# Patient Record
Sex: FEMALE | Race: WHITE | Employment: OTHER | URBAN - NONMETROPOLITAN AREA
[De-identification: names, ages, dates, MRNs, and addresses within clinical notes are randomized per-mention and may not be internally consistent; named-entity substitution may affect disease eponyms.]

---

## 2021-08-05 ENCOUNTER — APPOINTMENT (OUTPATIENT)
Dept: GENERAL RADIOLOGY | Age: 86
End: 2021-08-05
Attending: EMERGENCY MEDICINE
Payer: MEDICARE

## 2021-08-05 ENCOUNTER — HOSPITAL ENCOUNTER (INPATIENT)
Age: 86
LOS: 2 days | Discharge: NURSING, REHAB OR LONG-TERM CARE | DRG: 536 | End: 2021-08-09
Attending: EMERGENCY MEDICINE | Admitting: HOSPITALIST
Payer: MEDICARE

## 2021-08-05 ENCOUNTER — HOSPITAL ENCOUNTER (EMERGENCY)
Age: 86
Discharge: ANOTHER HOSPITAL | End: 2021-08-05
Attending: EMERGENCY MEDICINE
Payer: MEDICARE

## 2021-08-05 ENCOUNTER — APPOINTMENT (OUTPATIENT)
Dept: CT IMAGING | Age: 86
DRG: 536 | End: 2021-08-05
Attending: EMERGENCY MEDICINE
Payer: MEDICARE

## 2021-08-05 ENCOUNTER — APPOINTMENT (OUTPATIENT)
Dept: CT IMAGING | Age: 86
End: 2021-08-05
Attending: EMERGENCY MEDICINE
Payer: MEDICARE

## 2021-08-05 VITALS
WEIGHT: 115 LBS | DIASTOLIC BLOOD PRESSURE: 82 MMHG | OXYGEN SATURATION: 97 % | BODY MASS INDEX: 22.58 KG/M2 | SYSTOLIC BLOOD PRESSURE: 173 MMHG | RESPIRATION RATE: 20 BRPM | HEART RATE: 77 BPM | HEIGHT: 60 IN | TEMPERATURE: 98.2 F

## 2021-08-05 DIAGNOSIS — S42.215A CLOSED NONDISPLACED FRACTURE OF SURGICAL NECK OF LEFT HUMERUS, UNSPECIFIED FRACTURE MORPHOLOGY, INITIAL ENCOUNTER: ICD-10-CM

## 2021-08-05 DIAGNOSIS — S32.811A MULTIPLE CLOSED FRACTURES OF PELVIS WITH UNSTABLE DISRUPTION OF PELVIC RING, INITIAL ENCOUNTER (HCC): Primary | ICD-10-CM

## 2021-08-05 DIAGNOSIS — S32.9XXA CLOSED NONDISPLACED FRACTURE OF PELVIS, UNSPECIFIED PART OF PELVIS, INITIAL ENCOUNTER (HCC): Primary | ICD-10-CM
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
## 2021-08-05 LAB
ABO + RH BLD: NORMAL
ALBUMIN SERPL-MCNC: 3.5 G/DL (ref 3.5–5)
ALBUMIN/GLOB SERPL: 1.1 {RATIO} (ref 1.1–2.2)
ALP SERPL-CCNC: 134 U/L (ref 45–117)
ALT SERPL-CCNC: 21 U/L (ref 12–78)
ANION GAP SERPL CALC-SCNC: 5 MMOL/L (ref 5–15)
APTT PPP: 32 SEC (ref 21.2–34.1)
AST SERPL W P-5'-P-CCNC: 23 U/L (ref 15–37)
BASOPHILS # BLD: 0 K/UL (ref 0–0.1)
BASOPHILS NFR BLD: 0 % (ref 0–1)
BILIRUB SERPL-MCNC: 0.8 MG/DL (ref 0.2–1)
BLOOD GROUP ANTIBODIES SERPL: NEGATIVE
BUN SERPL-MCNC: 14 MG/DL (ref 6–20)
BUN/CREAT SERPL: 20 (ref 12–20)
CA-I BLD-MCNC: 8.6 MG/DL (ref 8.5–10.1)
CHLORIDE SERPL-SCNC: 105 MMOL/L (ref 97–108)
CO2 SERPL-SCNC: 30 MMOL/L (ref 21–32)
CREAT SERPL-MCNC: 0.71 MG/DL (ref 0.55–1.02)
DIFFERENTIAL METHOD BLD: ABNORMAL
EOSINOPHIL # BLD: 0.2 K/UL (ref 0–0.4)
EOSINOPHIL NFR BLD: 2 % (ref 0–7)
ERYTHROCYTE [DISTWIDTH] IN BLOOD BY AUTOMATED COUNT: 15.3 % (ref 11.5–14.5)
GLOBULIN SER CALC-MCNC: 3.3 G/DL (ref 2–4)
GLUCOSE SERPL-MCNC: 107 MG/DL (ref 65–100)
HCT VFR BLD AUTO: 40 % (ref 35–47)
HGB BLD-MCNC: 13.6 G/DL (ref 11.5–16)
IMM GRANULOCYTES # BLD AUTO: 0.1 K/UL (ref 0–0.04)
IMM GRANULOCYTES NFR BLD AUTO: 1 % (ref 0–0.5)
INR PPP: 1.1 (ref 0.9–1.1)
LYMPHOCYTES # BLD: 0.9 K/UL (ref 0.8–3.5)
LYMPHOCYTES NFR BLD: 11 % (ref 12–49)
MCH RBC QN AUTO: 34.9 PG (ref 26–34)
MCHC RBC AUTO-ENTMCNC: 34 G/DL (ref 30–36.5)
MCV RBC AUTO: 102.6 FL (ref 80–99)
MONOCYTES # BLD: 0.8 K/UL (ref 0–1)
MONOCYTES NFR BLD: 9 % (ref 5–13)
NEUTS SEG # BLD: 6.9 K/UL (ref 1.8–8)
NEUTS SEG NFR BLD: 77 % (ref 32–75)
NRBC # BLD: 0 K/UL (ref 0–0.01)
NRBC BLD-RTO: 0 PER 100 WBC
PLATELET # BLD AUTO: 151 K/UL (ref 150–400)
PMV BLD AUTO: 11.7 FL (ref 8.9–12.9)
POTASSIUM SERPL-SCNC: 3.2 MMOL/L (ref 3.5–5.1)
PROT SERPL-MCNC: 6.8 G/DL (ref 6.4–8.2)
PROTHROMBIN TIME: 13.6 SEC (ref 11.9–14.7)
RBC # BLD AUTO: 3.9 M/UL (ref 3.8–5.2)
SODIUM SERPL-SCNC: 140 MMOL/L (ref 136–145)
SPECIMEN EXP DATE BLD: NORMAL
THERAPEUTIC RANGE,PTTT: NORMAL SEC (ref 82–109)
WBC # BLD AUTO: 8.9 K/UL (ref 3.6–11)

## 2021-08-05 PROCEDURE — 87086 URINE CULTURE/COLONY COUNT: CPT

## 2021-08-05 PROCEDURE — 85025 COMPLETE CBC W/AUTO DIFF WBC: CPT

## 2021-08-05 PROCEDURE — 70450 CT HEAD/BRAIN W/O DYE: CPT

## 2021-08-05 PROCEDURE — 81001 URINALYSIS AUTO W/SCOPE: CPT

## 2021-08-05 PROCEDURE — 80053 COMPREHEN METABOLIC PANEL: CPT

## 2021-08-05 PROCEDURE — 85730 THROMBOPLASTIN TIME PARTIAL: CPT

## 2021-08-05 PROCEDURE — 99218 HC RM OBSERVATION: CPT

## 2021-08-05 PROCEDURE — 74011250637 HC RX REV CODE- 250/637: Performed by: PHYSICIAN ASSISTANT

## 2021-08-05 PROCEDURE — 36415 COLL VENOUS BLD VENIPUNCTURE: CPT

## 2021-08-05 PROCEDURE — 86901 BLOOD TYPING SEROLOGIC RH(D): CPT

## 2021-08-05 PROCEDURE — 93005 ELECTROCARDIOGRAM TRACING: CPT

## 2021-08-05 PROCEDURE — 99284 EMERGENCY DEPT VISIT MOD MDM: CPT

## 2021-08-05 PROCEDURE — 74176 CT ABD & PELVIS W/O CONTRAST: CPT

## 2021-08-05 PROCEDURE — 85610 PROTHROMBIN TIME: CPT

## 2021-08-05 PROCEDURE — 73030 X-RAY EXAM OF SHOULDER: CPT

## 2021-08-05 RX ORDER — SODIUM CHLORIDE 0.9 % (FLUSH) 0.9 %
5-40 SYRINGE (ML) INJECTION EVERY 8 HOURS
Status: DISCONTINUED | OUTPATIENT
Start: 2021-08-05 | End: 2021-08-09 | Stop reason: HOSPADM

## 2021-08-05 RX ORDER — OXYBUTYNIN CHLORIDE 5 MG/1
5 TABLET ORAL 3 TIMES DAILY
Status: DISCONTINUED | OUTPATIENT
Start: 2021-08-05 | End: 2021-08-06

## 2021-08-05 RX ORDER — LEVOTHYROXINE SODIUM 75 UG/1
75 TABLET ORAL
COMMUNITY

## 2021-08-05 RX ORDER — ACETAMINOPHEN 650 MG/1
650 SUPPOSITORY RECTAL
Status: DISCONTINUED | OUTPATIENT
Start: 2021-08-05 | End: 2021-08-09 | Stop reason: HOSPADM

## 2021-08-05 RX ORDER — TRIAMCINOLONE ACETONIDE 1 MG/G
OINTMENT TOPICAL 2 TIMES DAILY
COMMUNITY

## 2021-08-05 RX ORDER — POLYETHYLENE GLYCOL 3350 17 G/17G
17 POWDER, FOR SOLUTION ORAL DAILY PRN
Status: DISCONTINUED | OUTPATIENT
Start: 2021-08-05 | End: 2021-08-09 | Stop reason: HOSPADM

## 2021-08-05 RX ORDER — AMLODIPINE BESYLATE 5 MG/1
5 TABLET ORAL DAILY
Status: DISCONTINUED | OUTPATIENT
Start: 2021-08-06 | End: 2021-08-09 | Stop reason: HOSPADM

## 2021-08-05 RX ORDER — ACETAMINOPHEN 325 MG/1
650 TABLET ORAL
Status: DISCONTINUED | OUTPATIENT
Start: 2021-08-05 | End: 2021-08-09 | Stop reason: HOSPADM

## 2021-08-05 RX ORDER — TRAMADOL HYDROCHLORIDE 50 MG/1
50 TABLET ORAL
Status: DISCONTINUED | OUTPATIENT
Start: 2021-08-05 | End: 2021-08-09 | Stop reason: HOSPADM

## 2021-08-05 RX ORDER — LEVOTHYROXINE SODIUM 75 UG/1
75 TABLET ORAL
Status: DISCONTINUED | OUTPATIENT
Start: 2021-08-06 | End: 2021-08-09 | Stop reason: HOSPADM

## 2021-08-05 RX ORDER — ONDANSETRON 2 MG/ML
4 INJECTION INTRAMUSCULAR; INTRAVENOUS
Status: DISCONTINUED | OUTPATIENT
Start: 2021-08-05 | End: 2021-08-09 | Stop reason: HOSPADM

## 2021-08-05 RX ORDER — AMLODIPINE BESYLATE 5 MG/1
5 TABLET ORAL DAILY
COMMUNITY

## 2021-08-05 RX ORDER — OXYBUTYNIN CHLORIDE 5 MG/1
5 TABLET ORAL 3 TIMES DAILY
COMMUNITY

## 2021-08-05 RX ORDER — GUAIFENESIN 100 MG/5ML
200 SOLUTION ORAL
COMMUNITY
End: 2021-08-09

## 2021-08-05 RX ORDER — SODIUM CHLORIDE 0.9 % (FLUSH) 0.9 %
5-40 SYRINGE (ML) INJECTION AS NEEDED
Status: DISCONTINUED | OUTPATIENT
Start: 2021-08-05 | End: 2021-08-09 | Stop reason: HOSPADM

## 2021-08-05 RX ORDER — POLYETHYLENE GLYCOL 400 AND PROPYLENE GLYCOL 4; 3 MG/ML; MG/ML
2 SOLUTION/ DROPS OPHTHALMIC AS NEEDED
COMMUNITY

## 2021-08-05 RX ORDER — ONDANSETRON 4 MG/1
4 TABLET, ORALLY DISINTEGRATING ORAL
Status: DISCONTINUED | OUTPATIENT
Start: 2021-08-05 | End: 2021-08-09 | Stop reason: HOSPADM

## 2021-08-05 RX ORDER — SENNOSIDES 8.6 MG/1
1 TABLET ORAL DAILY
COMMUNITY

## 2021-08-05 RX ADMIN — Medication 10 ML: at 22:53

## 2021-08-05 RX ADMIN — OXYBUTYNIN CHLORIDE 5 MG: 5 TABLET ORAL at 22:49

## 2021-08-05 NOTE — ED PROVIDER NOTES
EMERGENCY DEPARTMENT HISTORY AND PHYSICAL EXAM      Date: 8/5/2021  Patient Name: Valeriy Hernandez    History of Presenting Illness     Chief Complaint   Patient presents with   Phoebe Conine Fall    Hip Pain       History Provided By: Patient and Nursing Home/SNF/Rehab Center    HPI: Valeriy Hernandez, 80 y.o. female with a past medical history significant Dementia presents to the ED with cc of pain to left hip when attempting to stand and possible pain in left shoulder; patient fell yesterday from standing position plain x-rays done showed no signs of fracture or dislocation of the hip but today patient is complaining of pain in the left hip and pain when moving left upper extremity    There are no other complaints, changes, or physical findings at this time. PCP: Marcia Waldron MD    No current facility-administered medications on file prior to encounter. No current outpatient medications on file prior to encounter. Past History     Past Medical History:  No past medical history on file. Past Surgical History:  No past surgical history on file. Family History:  No family history on file. Social History:  Social History     Tobacco Use    Smoking status: Not on file   Substance Use Topics    Alcohol use: Not on file    Drug use: Not on file       Allergies:  No Known Allergies      Review of Systems     Review of Systems   Constitutional: Negative for chills and fever. HENT: Negative for rhinorrhea and sore throat. Eyes: Negative for pain and visual disturbance. Respiratory: Negative for cough and shortness of breath. Cardiovascular: Negative for chest pain and leg swelling. Gastrointestinal: Negative for abdominal pain and vomiting. Endocrine: Negative for polydipsia and polyuria. Genitourinary: Negative for dysuria and urgency. Musculoskeletal: Positive for arthralgias and myalgias. Skin: Negative for color change and pallor.    Neurological: Negative for weakness and numbness. Psychiatric/Behavioral: Negative. Physical Exam     Physical Exam  Vitals and nursing note reviewed. Constitutional:       General: She is not in acute distress. Appearance: She is not ill-appearing or toxic-appearing. HENT:      Head: Normocephalic and atraumatic. Mouth/Throat:      Mouth: Mucous membranes are moist.      Pharynx: Oropharynx is clear. Eyes:      Extraocular Movements: Extraocular movements intact. Conjunctiva/sclera: Conjunctivae normal.      Pupils: Pupils are equal, round, and reactive to light. Cardiovascular:      Rate and Rhythm: Normal rate and regular rhythm. Pulses: Normal pulses. Heart sounds: Normal heart sounds. Pulmonary:      Effort: Pulmonary effort is normal.      Breath sounds: Normal breath sounds. Abdominal:      General: Bowel sounds are normal.      Palpations: Abdomen is soft. Musculoskeletal:         General: Tenderness, deformity and signs of injury present. Right shoulder: Normal.      Left shoulder: Deformity and crepitus present. No tenderness. Cervical back: Normal range of motion and neck supple. Right hip: Normal.      Left hip: Tenderness present. Decreased range of motion. Skin:     General: Skin is warm and dry. Capillary Refill: Capillary refill takes less than 2 seconds. Neurological:      General: No focal deficit present. Mental Status: She is alert. Mental status is at baseline. Psychiatric:         Mood and Affect: Mood normal.         Behavior: Behavior normal.         Lab and Diagnostic Study Results     Labs -   No results found for this or any previous visit (from the past 12 hour(s)).     Radiologic Studies -   @lastxrresult@  CT Results  (Last 48 hours)               08/05/21 1247  CT ABD PELV WO CONT Final result    Impression:  Multiple pelvic fractures, mainly on the left, not involving the   hips       Narrative:  CT dose reduction was achieved through use of a standardized protocol tailored   for this examination and automatic exposure control for dose modulation. Noncontrast study shows normal liver, spleen, pancreas. Gallstone and high   density bile. Adrenals and kidneys are normal       Advanced arterial calcification. No small bowel abnormality, lymphadenopathy or   free fluid       Uterus is small. No mass or free fluid. Bladder is empty. Rectum mildly   distended with gas. Advanced distal diverticulosis without wall thickening or   fat stranding       Slightly offset oblique fractures of left inferior pubic ramus and ischium. Mild   adjacent hematoma. Slightly distracted fracture of the left superior pubic   ramus, close to the anterior acetabular column. No fracture of the hip. Slightly   offset oblique fracture of the right superior pubic ramus immediately adjacent   to the symphysis       Advanced degenerative changes in the spine and central endplate depressions,   likely all chronic               CXR Results  (Last 48 hours)    None            Medical Decision Making   - I am the first provider for this patient. - I reviewed the vital signs, available nursing notes, past medical history, past surgical history, family history and social history. - Initial assessment performed. The patients presenting problems have been discussed, and they are in agreement with the care plan formulated and outlined with them. I have encouraged them to ask questions as they arise throughout their visit. Vital Signs-Reviewed the patient's vital signs.   Patient Vitals for the past 12 hrs:   Temp Pulse Resp BP SpO2   08/05/21 1359     97 %   08/05/21 1358  77 20 (!) 173/82 97 %   08/05/21 1218 98.2 °F (36.8 °C) 85 18 (!) 188/95 97 %       Records Reviewed: Nursing Notes    The patient presents with joint pain with a differential diagnosis of closed fracture, dislocations, ligamentous strain      ED Course:     ED Course as of Aug 05 1410   Thu Aug 05, 2021 [phone] Requested consult called to Dr. Lc Gomez    [SB]   2807 Dr. Lc Gomez asked that patient be transferred to [facility]    [SB]      ED Course User Index  [SB] Sander Sage MD       Provider Notes (Medical Decision Making): MDM       Procedures   Medical Decision Makingedical Decision Making  Performed by: Precious Becerril MD  PROCEDURES:  Procedures       Disposition   Disposition: Condition stable and ongoing  Transferred to [facility] patient verbally agreed to transfer and understand the risks involved as outlined in the EMTALA form. Transferred to [address] [place]:  1. There are no discharge medications for this patient. 2.   Follow-up Information    None       3. Return to ED if worse   4. There are no discharge medications for this patient. Diagnosis     Clinical Impression:   1. Multiple closed fractures of pelvis with unstable disruption of pelvic ring, initial encounter ([facility] [address]    2. Closed nondisplaced fracture of surgical neck of left humerus, unspecified fracture morphology, initial encounter        Attestations:    Precious Becerril MD    Please note that this dictation was completed with [employer], the computer voice recognition software. Quite often unanticipated grammatical, syntax, homophones, and other interpretive errors are inadvertently transcribed by the computer software. Please disregard these errors. Please excuse any errors that have escaped final proofreading. Thank you.

## 2021-08-05 NOTE — ED TRIAGE NOTES
Pt is a transfer from [facility] in [facility]; pt sustained a fall yesterday and per a CT of the ABD/Pelvis pt has multiple pelvic fxs noted along with a left humeral neck fx; pt here for orthopeadic evaluation and admission; pt is very [facility]

## 2021-08-05 NOTE — ED NOTES
Pt report given to [place], [facility] waiting on truck at this time

## 2021-08-05 NOTE — H&P
History and Physical    Patient: Conrado Jerez MRN: [id number]  SSN: [id number]    YOB: 1917  Age: 80 y.o. Sex: female      Subjective:      Conrado Jerez is a 80 y.o. female who presents to the emergency department after a ground-level fall 2020 4 hours ago. Initial x-rays did not reveal any obvious fractures. Patient has a chronic left shoulder fracture from a fall 3 years ago. . CT scan of the abdomen pelvis was performed for further evaluation revealing multiple pelvic fractures mainly on the left and not involving the hips. There was a adjacent hematoma present. Orthopedics has been contacted and would like the patient admitted for observation. CT of the head with no acute intracranial abnormality. Patient is from [facility]. Have discussed the case with the patient's power of , son Jerson Daley at home phone number [phone]    Past Medical History:   Diagnosis Date    Hypertension      Past Surgical History:   Procedure Laterality Date    HX OTHER SURGICAL      none      Family History   Problem Relation Age of Onset    Hypertension Mother      Social History     Tobacco Use    Smoking status: Never Smoker    Smokeless tobacco: Never Used   Substance Use Topics    Alcohol use: Not Currently      Prior to Admission medications    Medication Sig Start Date End Date Taking? Authorizing Provider   guaiFENesin (ROBITUSSIN) 100 mg/5 mL liquid Take 200 mg by mouth three (3) times daily as needed for Cough. Yes Provider, Historical   peg 400-propylene glycol (Systane, propylene glycoL,) 0.4-0.3 % drop 2 Drops as needed. Yes Provider, Historical   oxybutynin (DITROPAN) 5 mg tablet Take 5 mg by mouth three (3) times daily. Yes Provider, Historical   senna (Senna) 8.6 mg tablet Take 1 Tablet by mouth daily. Yes Provider, Historical   levothyroxine (SYNTHROID) 75 mcg tablet Take 75 mcg by mouth Daily (before breakfast).    Yes Provider, Historical amLODIPine (Norvasc) 5 mg tablet Take 5 mg by mouth daily. Yes Provider, Historical        No Known Allergies    Review of Systems:  Review of Systems   Unable to perform ROS: Patient nonverbal        Objective:     Recent Results (from the past 24 hour(s))   CBC WITH AUTOMATED DIFF    Collection Time: 08/05/21  4:30 PM   Result Value Ref Range    WBC 8.9 3.6 - 11.0 K/uL    RBC 3.90 3.80 - 5.20 M/uL    HGB 13.6 11.5 - 16.0 g/dL    HCT 40.0 35.0 - 47.0 %    .6 (H) 80.0 - 99.0 FL    MCH 34.9 (H) 26.0 - 34.0 PG    MCHC 34.0 30.0 - 36.5 g/dL    RDW 15.3 (H) 11.5 - 14.5 %    PLATELET 196 572 - 195 K/uL    MPV 11.7 8.9 - 12.9 FL    NRBC 0.0 0.0  WBC    ABSOLUTE NRBC 0.00 0.00 - 0.01 K/uL    NEUTROPHILS 77 (H) 32 - 75 %    LYMPHOCYTES 11 (L) 12 - 49 %    MONOCYTES 9 5 - 13 %    EOSINOPHILS 2 0 - 7 %    BASOPHILS 0 0 - 1 %    IMMATURE GRANULOCYTES 1 (H) 0 - 0.5 %    ABS. NEUTROPHILS 6.9 1.8 - 8.0 K/UL    ABS. LYMPHOCYTES 0.9 0.8 - 3.5 K/UL    ABS. MONOCYTES 0.8 0.0 - 1.0 K/UL    ABS. EOSINOPHILS 0.2 0.0 - 0.4 K/UL    ABS. BASOPHILS 0.0 0.0 - 0.1 K/UL    ABS. IMM. GRANS. 0.1 (H) 0.00 - 0.04 K/UL    DF AUTOMATED     METABOLIC PANEL, COMPREHENSIVE    Collection Time: 08/05/21  4:30 PM   Result Value Ref Range    Sodium 140 136 - 145 mmol/L    Potassium 3.2 (L) 3.5 - 5.1 mmol/L    Chloride 105 97 - 108 mmol/L    CO2 30 21 - 32 mmol/L    Anion gap 5 5 - 15 mmol/L    Glucose 107 (H) 65 - 100 mg/dL    BUN 14 6 - 20 mg/dL    Creatinine 0.71 0.55 - 1.02 mg/dL    BUN/Creatinine ratio 20 12 - 20      GFR est AA >60 >60 ml/min/1.73m2    GFR est non-AA >60 >60 ml/min/1.73m2    Calcium 8.6 8.5 - 10.1 mg/dL    Bilirubin, total 0.8 0.2 - 1.0 mg/dL    AST (SGOT) 23 15 - 37 U/L    ALT (SGPT) 21 12 - 78 U/L    Alk.  phosphatase 134 (H) 45 - 117 U/L    Protein, total 6.8 6.4 - 8.2 g/dL    Albumin 3.5 3.5 - 5.0 g/dL    Globulin 3.3 2.0 - 4.0 g/dL    A-G Ratio 1.1 1.1 - 2.2     PROTHROMBIN TIME + INR    Collection Time: 08/05/21 4:30 PM   Result Value Ref Range    Prothrombin time 13.6 11.9 - 14.7 sec    INR 1.1 0.9 - 1.1     PTT    Collection Time: 08/05/21  4:30 PM   Result Value Ref Range    aPTT 32.0 21.2 - 34.1 sec    aPTT, therapeutic range   82 - 109 sec        CT HEAD WO CONT   Final Result   No acute intracranial abnormality. Chronic changes as described. Vitals:    08/05/21 1547   BP: (!) 152/84   Pulse: 73   Resp: 16   Temp: 98.3 °F (36.8 °C)   SpO2: 98%   Weight: 52.2 kg (115 lb)   Height: 5' (1.524 m)        Physical Exam:  Physical Exam  Vitals reviewed. HENT:      Head: Normocephalic and atraumatic. Mouth/Throat:      Mouth: Mucous membranes are moist.      Pharynx: Oropharynx is clear. Cardiovascular:      Rate and Rhythm: Normal rate and regular rhythm. Heart sounds: Normal heart sounds. Pulmonary:      Effort: Pulmonary effort is normal.      Breath sounds: Normal breath sounds. Abdominal:      General: Abdomen is flat. Bowel sounds are normal.      Palpations: Abdomen is soft. Musculoskeletal:      Cervical back: Normal range of motion and neck supple. Comments: Pain in the right posterior and left posterior pelvis as well as anterior left pelvis. Patient has sensation present as she grimaces with deep palpation of the lower extremities. Skin:     General: Skin is warm and dry. Neurological:      General: No focal deficit present. Mental Status: She is alert. She is disoriented. Psychiatric:         Mood and Affect: Mood normal.          Assessment:     Hospital Problems  Never Reviewed        Codes Class Noted POA    Pelvic fracture ([facility] ICD-10-CM: S32. 9XXA  ICD-9-CM: 808.8  8/5/2021 Unknown        Fall ICD-10-CM: W19. Barbara Nikolay  ICD-9-CM: W402.9  8/5/2021 Unknown              Plan:     Impression:  1. Ground-level fall  2. Pelvic fracture    Plan:    1. Ground-level fall  UA for further evaluation of septic causes for the fall    2.   Pelvic fracture  Orthopedic consultation  CT with multiple fractures of the pelvis without involving the hip with adjacent hematoma  Continue to monitor hemoglobin hematocrit, labs currently pending    3.   Essential hypertension  Continue Norvasc    CODE STATUS: DNR    DVT prophylaxis: SCDs  Ulcer prophylaxis: Not indicated    Discussed case with patient's power of  Delroy Rosas and he confirmed DNR status, home number [phone]    Time of evaluation 50 minutes    Signed By: Maida Malagon PA-C     August 5, 2021

## 2021-08-05 NOTE — ED TRIAGE NOTES
EMS reports that patient had a ground level fall yesterday, was wearing hip protectors, portable xray was done yesterday but this morning patient was still reporting increase pain to the left hip. Nursing home staff states that patient was also grimacing in pain when her left arm was moved, but EMS states they were able to move patient's left arm with no pain from patient.

## 2021-08-05 NOTE — ED PROVIDER NOTES
EMERGENCY DEPARTMENT HISTORY AND PHYSICAL EXAM        Date: 8/5/2021  Patient Name: Heike Gallegos    History of Presenting Illness     Chief Complaint   Patient presents with    Fracture       History Provided By: Transferring emergency medicine physician    HPI: Heike Gallegos, 80 y.o. female with history of atrial fibrillation, Alzheimer's disease and hypertension who presents with ground-level fall that happened yesterday. Had an x-ray done yesterday that showed no fractures. CT scan was done due to pain in the hip at outside facility today showing fractures. Patient transferred here for further care and orthopedic consultation. Patient is a poor historian and does not remember the fall. She has poor hearing as well. History is limited due to this. PCP: Chaz Cardenas MD        Past History     Past Medical History:  No past medical history on file. Past Surgical History:  No past surgical history on file. Family History:  No family history on file. Social History:  Social History     Tobacco Use    Smoking status: Not on file   Substance Use Topics    Alcohol use: Not on file    Drug use: Not on file       Allergies:  No Known Allergies      Review of Systems   Review of Systems   Unable to perform ROS: Dementia     Physical Exam   Constitutional: No acute distress. Thin. Skin: No rash. ENT: No rhinorrhea. No cough. Head is normocephalic and atraumatic. Eye: No proptosis or conjunctival injections. Respiratory: No apparent respiratory distress. Gastrointestinal: Nondistended. Musculoskeletal: No obvious bony deformities. Tenderness to the pelvis. Psychiatric: Cooperative. Appropriate mood and affect. Diagnostic Study Results     Labs -   No results found for this or any previous visit (from the past 24 hour(s)).     Radiologic Studies -   No orders to display     CT Results  (Last 48 hours)               08/05/21 1247  CT ABD PELV WO CONT Final result    Impression: Multiple pelvic fractures, mainly on the left, not involving the   hips       Narrative:  CT dose reduction was achieved through use of a standardized protocol tailored   for this examination and automatic exposure control for dose modulation. Noncontrast study shows normal liver, spleen, pancreas. Gallstone and high   density bile. Adrenals and kidneys are normal       Advanced arterial calcification. No small bowel abnormality, lymphadenopathy or   free fluid       Uterus is small. No mass or free fluid. Bladder is empty. Rectum mildly   distended with gas. Advanced distal diverticulosis without wall thickening or   fat stranding       Slightly offset oblique fractures of left inferior pubic ramus and ischium. Mild   adjacent hematoma. Slightly distracted fracture of the left superior pubic   ramus, close to the anterior acetabular column. No fracture of the hip. Slightly   offset oblique fracture of the right superior pubic ramus immediately adjacent   to the symphysis       Advanced degenerative changes in the spine and central endplate depressions,   likely all chronic               CXR Results  (Last 48 hours)    None          Medical Decision Making and ED Course     I reviewed the available vital signs, nursing notes, past medical history, past surgical history, family history, and social history. Vital Signs - Reviewed the patient's vital signs. Patient Vitals for the past 12 hrs:   Temp Pulse Resp BP SpO2   08/05/21 1547 98.3 °F (36.8 °C) 73 16 (!) 152/84 98 %     EKG interpretation: Obtained on 8/5/2021 at 1701. Read at [id number]. Normal sinus rhythm at rate of 72 bpm.  Normal AK interval, QRS duration, QTc interval.  No ST segment abnormalities. Normal axis. Records Reviewed: Outside facility records and imaging    Medical Decision Making:   Presented with fall. The differential diagnosis is hip fracture, pelvic fracture, femur fracture, contusion. Work-up shows pelvic fractures.   There is a hematoma. Patient discussed with the orthopedic surgeon, Dr. Cheikh Wahl, who recommended observation overnight and recommended that this is nonoperative. Patient will need to be in a wheelchair for 4 weeks or so. Patient admitted to hospice for further care and eval.  Labs are pending. Disposition     Admitted to hospitalist    Diagnosis     Clinical impression:   1. Closed nondisplaced fracture of pelvis, unspecified part of pelvis, initial encounter [facility])           Attestation:  Please note that this dictation was completed with [employer], the computer voice recognition software. Quite often unanticipated grammatical, syntax, homophones, and other interpretive errors are inadvertently transcribed by the computer software. Please disregard these errors. Please excuse any errors that have escaped final proofreading. Thank you.   Celia Tripp, DO

## 2021-08-06 LAB
ALBUMIN SERPL-MCNC: 2.9 G/DL (ref 3.5–5)
ALBUMIN/GLOB SERPL: 1 {RATIO} (ref 1.1–2.2)
ALP SERPL-CCNC: 110 U/L (ref 45–117)
ALT SERPL-CCNC: 17 U/L (ref 12–78)
ANION GAP SERPL CALC-SCNC: 5 MMOL/L (ref 5–15)
APPEARANCE UR: CLEAR
AST SERPL W P-5'-P-CCNC: 19 U/L (ref 15–37)
ATRIAL RATE: 72 BPM
BACTERIA URNS QL MICRO: NEGATIVE /HPF
BASOPHILS # BLD: 0 K/UL (ref 0–0.1)
BASOPHILS NFR BLD: 1 % (ref 0–1)
BILIRUB SERPL-MCNC: 1 MG/DL (ref 0.2–1)
BILIRUB UR QL: NEGATIVE
BUN SERPL-MCNC: 14 MG/DL (ref 6–20)
BUN/CREAT SERPL: 23 (ref 12–20)
CA-I BLD-MCNC: 8.2 MG/DL (ref 8.5–10.1)
CALCULATED P AXIS, ECG09: 0 DEGREES
CALCULATED R AXIS, ECG10: -12 DEGREES
CALCULATED T AXIS, ECG11: -174 DEGREES
CHLORIDE SERPL-SCNC: 107 MMOL/L (ref 97–108)
CO2 SERPL-SCNC: 29 MMOL/L (ref 21–32)
COLOR UR: ABNORMAL
CREAT SERPL-MCNC: 0.62 MG/DL (ref 0.55–1.02)
DIAGNOSIS, 93000: NORMAL
DIFFERENTIAL METHOD BLD: ABNORMAL
EOSINOPHIL # BLD: 0.3 K/UL (ref 0–0.4)
EOSINOPHIL NFR BLD: 4 % (ref 0–7)
ERYTHROCYTE [DISTWIDTH] IN BLOOD BY AUTOMATED COUNT: 15.3 % (ref 11.5–14.5)
GLOBULIN SER CALC-MCNC: 2.8 G/DL (ref 2–4)
GLUCOSE SERPL-MCNC: 92 MG/DL (ref 65–100)
GLUCOSE UR STRIP.AUTO-MCNC: NEGATIVE MG/DL
HCT VFR BLD AUTO: 35.5 % (ref 35–47)
HGB BLD-MCNC: 12 G/DL (ref 11.5–16)
HGB UR QL STRIP: NEGATIVE
IMM GRANULOCYTES # BLD AUTO: 0 K/UL (ref 0–0.04)
IMM GRANULOCYTES NFR BLD AUTO: 1 % (ref 0–0.5)
KETONES UR QL STRIP.AUTO: NEGATIVE MG/DL
LEUKOCYTE ESTERASE UR QL STRIP.AUTO: NEGATIVE
LYMPHOCYTES # BLD: 0.8 K/UL (ref 0.8–3.5)
LYMPHOCYTES NFR BLD: 10 % (ref 12–49)
MCH RBC QN AUTO: 34.9 PG (ref 26–34)
MCHC RBC AUTO-ENTMCNC: 33.8 G/DL (ref 30–36.5)
MCV RBC AUTO: 103.2 FL (ref 80–99)
MONOCYTES # BLD: 0.8 K/UL (ref 0–1)
MONOCYTES NFR BLD: 10 % (ref 5–13)
NEUTS SEG # BLD: 6.1 K/UL (ref 1.8–8)
NEUTS SEG NFR BLD: 74 % (ref 32–75)
NITRITE UR QL STRIP.AUTO: NEGATIVE
NRBC # BLD: 0 K/UL (ref 0–0.01)
NRBC BLD-RTO: 0 PER 100 WBC
P-R INTERVAL, ECG05: 112 MS
PH UR STRIP: 6 [PH] (ref 5–8)
PLATELET # BLD AUTO: 143 K/UL (ref 150–400)
PMV BLD AUTO: 12.3 FL (ref 8.9–12.9)
POTASSIUM SERPL-SCNC: 3.2 MMOL/L (ref 3.5–5.1)
PROT SERPL-MCNC: 5.7 G/DL (ref 6.4–8.2)
PROT UR STRIP-MCNC: 30 MG/DL
Q-T INTERVAL, ECG07: 386 MS
QRS DURATION, ECG06: 86 MS
QTC CALCULATION (BEZET), ECG08: 422 MS
RBC # BLD AUTO: 3.44 M/UL (ref 3.8–5.2)
RBC #/AREA URNS HPF: ABNORMAL /HPF (ref 0–5)
SODIUM SERPL-SCNC: 141 MMOL/L (ref 136–145)
SP GR UR REFRACTOMETRY: 1.02 (ref 1–1.03)
UA: UC IF INDICATED,UAUC: ABNORMAL
UROBILINOGEN UR QL STRIP.AUTO: 4 EU/DL (ref 0.1–1)
VENTRICULAR RATE, ECG03: 72 BPM
WBC # BLD AUTO: 8.1 K/UL (ref 3.6–11)
WBC URNS QL MICRO: ABNORMAL /HPF (ref 0–4)

## 2021-08-06 PROCEDURE — 85025 COMPLETE CBC W/AUTO DIFF WBC: CPT

## 2021-08-06 PROCEDURE — 99218 HC RM OBSERVATION: CPT

## 2021-08-06 PROCEDURE — 36415 COLL VENOUS BLD VENIPUNCTURE: CPT

## 2021-08-06 PROCEDURE — 74011250637 HC RX REV CODE- 250/637: Performed by: PHYSICIAN ASSISTANT

## 2021-08-06 PROCEDURE — 80053 COMPREHEN METABOLIC PANEL: CPT

## 2021-08-06 RX ORDER — SENNOSIDES 8.6 MG/1
1 TABLET ORAL DAILY
Status: DISCONTINUED | OUTPATIENT
Start: 2021-08-07 | End: 2021-08-09 | Stop reason: HOSPADM

## 2021-08-06 RX ORDER — POTASSIUM CHLORIDE 1.5 G/1.77G
20 POWDER, FOR SOLUTION ORAL DAILY
Status: DISCONTINUED | OUTPATIENT
Start: 2021-08-07 | End: 2021-08-09 | Stop reason: HOSPADM

## 2021-08-06 RX ORDER — OXYBUTYNIN CHLORIDE 5 MG/1
5 TABLET ORAL 2 TIMES DAILY
Status: DISCONTINUED | OUTPATIENT
Start: 2021-08-06 | End: 2021-08-09 | Stop reason: HOSPADM

## 2021-08-06 RX ADMIN — LEVOTHYROXINE SODIUM 75 MCG: 0.07 TABLET ORAL at 08:44

## 2021-08-06 RX ADMIN — OXYBUTYNIN CHLORIDE 5 MG: 5 TABLET ORAL at 17:36

## 2021-08-06 RX ADMIN — AMLODIPINE BESYLATE 5 MG: 5 TABLET ORAL at 08:44

## 2021-08-06 RX ADMIN — OXYBUTYNIN CHLORIDE 5 MG: 5 TABLET ORAL at 08:44

## 2021-08-06 RX ADMIN — Medication 10 ML: at 05:35

## 2021-08-06 RX ADMIN — Medication 10 ML: at 22:35

## 2021-08-06 RX ADMIN — Medication 10 ML: at 17:37

## 2021-08-06 NOTE — PROGRESS NOTES
Reason for Admission:   Pelvic Fracture                      RUR Score:          N/A           Plan for utilizing home health:    Patient is a resident at [facility]      PCP: First and Last name:  Soraida Castanon MD     Name of Practice:    Are you a current patient: Yes/No:    Approximate date of last visit:    Can you participate in a virtual visit with your PCP:                     Current Advanced Directive/Advance Care Plan: DNR      Healthcare Decision Maker:   Click here to complete [address] including selection of the Healthcare Decision Maker Relationship (ie \"Primary\")           [facility], [phone]                  Transition of Care Plan:                      Patient lives at [facility] as a long term care resident. Facility provides transport to follow-up appointments.   Current Dispo: SNF

## 2021-08-06 NOTE — PROGRESS NOTES
Hospitalist Progress Note           Daily Progress Note: 2021      Subjective: The patient is seen for follow  up. Patient comfortable laying in bed, pain is better controlled after IV Dilaudid 0.5 every 4h as needed,  family at the bedside, she from skilled nursing facility, patient awake    Problem List:  Problem List as of 2021 Never Reviewed        Codes Class Noted - Resolved    Pelvic fracture ([address] ICD-10-CM: S32. 9XXA  ICD-9-CM: 808.8  2021 - Present        Fall ICD-10-CM: W19. Kimberlyine Sailors  ICD-9-CM: E888.9  2021 - Present              Medications reviewed  Current Facility-Administered Medications   Medication Dose Route Frequency    amLODIPine (NORVASC) tablet 5 mg  5 mg Oral DAILY    levothyroxine (SYNTHROID) tablet 75 mcg  75 mcg Oral ACB    oxybutynin (DITROPAN) tablet 5 mg  5 mg Oral TID    sodium chloride (NS) flush 5-40 mL  5-40 mL IntraVENous Q8H    sodium chloride (NS) flush 5-40 mL  5-40 mL IntraVENous PRN    acetaminophen (TYLENOL) tablet 650 mg  650 mg Oral Q6H PRN    Or    acetaminophen (TYLENOL) suppository 650 mg  650 mg Rectal Q6H PRN    polyethylene glycol (MIRALAX) packet 17 g  17 g Oral DAILY PRN    ondansetron (ZOFRAN ODT) tablet 4 mg  4 mg Oral Q8H PRN    Or    ondansetron (ZOFRAN) injection 4 mg  4 mg IntraVENous Q6H PRN    traMADoL (ULTRAM) tablet 50 mg  50 mg Oral Q6H PRN       Review of Systems:   A comprehensive review of systems was negative except for that written in the HPI. Objective:   Physical Exam:     Visit Vitals  BP (!) 141/68 (BP 1 Location: Left upper arm, BP Patient Position: At rest)   Pulse 70   Temp 97.8 °F (36.6 °C)   Resp 18   Ht 5' (1.524 m)   Wt 51.9 kg (114 lb 6.7 oz)   SpO2 94%   Breastfeeding No   BMI 22.35 kg/m²      O2 Device: None (Room air)    Temp (24hrs), Av °F (36.7 °C), Min:97.7 °F (36.5 °C), Max:98.3 °F (36.8 °C)    No intake/output data recorded.     190 -  0700  In: 61 [P.O.:60]  Out: 150 [Urine:150]    General:  Awake, cooperative, no distress, appears stated age. Lungs:   Clear to auscultation bilaterally. Chest wall:  No tenderness or deformity. Heart:  Regular rate and rhythm, S1, S2 normal, no murmur, click, rub or gallop. Abdomen:   Soft, non-tender. Bowel sounds normal. No masses,  No organomegaly. Extremities: Extremities normal, atraumatic, no cyanosis or edema. Pulses: 2+ and symmetric all extremities. Skin: Skin color, texture, turgor normal. No rashes or lesions   Neurologic: CNII-XII intact. No gross sensory or motor deficits     Data Review:       Recent Days:  Recent Labs     08/06/21  0743 08/05/21  1630   WBC 8.1 8.9   HGB 12.0 13.6   HCT 35.5 40.0   * 151     Recent Labs     08/06/21  0743 08/05/21  1630    140   K 3.2* 3.2*    105   CO2 29 30   GLU 92 107*   BUN 14 14   CREA 0.62 0.71   CA 8.2* 8.6   ALB 2.9* 3.5   TBILI 1.0 0.8   ALT 17 21   INR  --  1.1     No results for input(s): PH, PCO2, PO2, HCO3, FIO2 in the last 72 hours.     24 Hour Results:  Recent Results (from the past 24 hour(s))   URINALYSIS W/ REFLEX CULTURE    Collection Time: 08/05/21 10:50 PM    Specimen: Urine   Result Value Ref Range    Color Yellow/Straw      Appearance Clear Clear      Specific gravity 1.024 1.003 - 1.030      pH (UA) 6.0 5.0 - 8.0      Protein 30 (A) Negative mg/dL    Glucose Negative Negative mg/dL    Ketone Negative Negative mg/dL    Bilirubin Negative Negative      Blood Negative Negative      Urobilinogen 4.0 (H) 0.1 - 1.0 EU/dL    Nitrites Negative Negative      Leukocyte Esterase Negative Negative      UA:UC IF INDICATED Urine Culture Ordered (A) Culture not indicated by UA result      WBC 0-5 0 - 4 /hpf    RBC 0-5 0 - 5 /hpf    Bacteria Negative Negative /hpf   CBC WITH AUTOMATED DIFF    Collection Time: 08/06/21  7:43 AM   Result Value Ref Range    WBC 8.1 3.6 - 11.0 K/uL    RBC 3.44 (L) 3.80 - 5.20 M/uL    HGB 12.0 11.5 - 16.0 g/dL HCT 35.5 35.0 - 47.0 %    .2 (H) 80.0 - 99.0 FL    MCH 34.9 (H) 26.0 - 34.0 PG    MCHC 33.8 30.0 - 36.5 g/dL    RDW 15.3 (H) 11.5 - 14.5 %    PLATELET 029 (L) 771 - 400 K/uL    MPV 12.3 8.9 - 12.9 FL    NRBC 0.0 0.0  WBC    ABSOLUTE NRBC 0.00 0.00 - 0.01 K/uL    NEUTROPHILS 74 32 - 75 %    LYMPHOCYTES 10 (L) 12 - 49 %    MONOCYTES 10 5 - 13 %    EOSINOPHILS 4 0 - 7 %    BASOPHILS 1 0 - 1 %    IMMATURE GRANULOCYTES 1 (H) 0 - 0.5 %    ABS. NEUTROPHILS 6.1 1.8 - 8.0 K/UL    ABS. LYMPHOCYTES 0.8 0.8 - 3.5 K/UL    ABS. MONOCYTES 0.8 0.0 - 1.0 K/UL    ABS. EOSINOPHILS 0.3 0.0 - 0.4 K/UL    ABS. BASOPHILS 0.0 0.0 - 0.1 K/UL    ABS. IMM. GRANS. 0.0 0.00 - 0.04 K/UL    DF AUTOMATED     METABOLIC PANEL, COMPREHENSIVE    Collection Time: 08/06/21  7:43 AM   Result Value Ref Range    Sodium 141 136 - 145 mmol/L    Potassium 3.2 (L) 3.5 - 5.1 mmol/L    Chloride 107 97 - 108 mmol/L    CO2 29 21 - 32 mmol/L    Anion gap 5 5 - 15 mmol/L    Glucose 92 65 - 100 mg/dL    BUN 14 6 - 20 mg/dL    Creatinine 0.62 0.55 - 1.02 mg/dL    BUN/Creatinine ratio 23 (H) 12 - 20      GFR est AA >60 >60 ml/min/1.73m2    GFR est non-AA >60 >60 ml/min/1.73m2    Calcium 8.2 (L) 8.5 - 10.1 mg/dL    Bilirubin, total 1.0 0.2 - 1.0 mg/dL    AST (SGOT) 19 15 - 37 U/L    ALT (SGPT) 17 12 - 78 U/L    Alk. phosphatase 110 45 - 117 U/L    Protein, total 5.7 (L) 6.4 - 8.2 g/dL    Albumin 2.9 (L) 3.5 - 5.0 g/dL    Globulin 2.8 2.0 - 4.0 g/dL    A-G Ratio 1.0 (L) 1.1 - 2.2             Assessment/   Impression:  1. Ground-level fall  2. Pelvic fracture  3  advanced age, bedridden status   4. Hypertension, well controlled   5. Hypokalemia   plan:     1. Ground-level fall  UA clear for UTI, patient afebrile     2.  Pelvic fracture  Orthopedic consultation, pain management  CT with multiple fractures of the pelvis without involving the hip with adjacent hematoma  Continue to monitor hemoglobin hematocrit, hemoglobin stable   3.   Essential hypertension  Continue Norvasc     CODE STATUS: DNR     DVT prophylaxis: SCDs  Ulcer prophylaxis: Not indicated  Patient son and daughter-in-law at bedside updated     Discussed case with patient's power of  David Garcia and he confirmed DNR status, home number [phone] per Staff     Time of evaluation 50 minutes      Care Plan discussed with: Patient/Family and Nurse    Total time spent with patient: 30 minutes.     Naresh Hanson MD

## 2021-08-06 NOTE — ROUTINE PROCESS
TRANSFER - OUT REPORT:    Verbal report [facility]  being [facility] to [place] [address] (unit) for routine progression of care       Report consisted of patients Situation, Background, Assessment and   Recommendations(SBAR). Information from the following report(s) ED Summary was reviewed with the receiving nurse. Lines:   Peripheral IV 08/05/21 Right Antecubital (Active)        Opportunity for questions and clarification was provided.       Patient transported with:   [employer]

## 2021-08-06 NOTE — PROGRESS NOTES
Patient skin assessment completed by myself and Michaelle Singh RN. Patient presents with scattered scabs/rash on chest/abdomen. Sacrum/buttocks erythema present, blanchable. Redness noted to inner thighs. Bruise on right foot. Skin otherwise unremarkable.

## 2021-08-06 NOTE — PROGRESS NOTES
Family at bedside. Patient alert and oriented x1. Hard of hearing. On room air, no distress noted. call bell in reach. Bed alarm on and side rails up x3.  No c/o pain at this time

## 2021-08-06 NOTE — CONSULTS
ORTHOPEDIC CONSULT    Patient: Kofi Joyce MRN: [id number]  SSN: [id number]    YOB: 1917  Age: 80 y.o. Sex: female      Subjective:      Kofi Joyce is a 80 y.o. female who is being seen in orthopedic consultation for evaluation of pelvic ramus fracture. The patient is hard of hearing, Alzheimer's, poor historian. History obtained from chart. Apparently the patient had a ground-level fall on 8/4/2021 at her nursing home. [facility] in [facility] patient had portable x-rays performed that did not show any acute fractures. Apparently she continued to have pain and was reevaluated CT scan was done and confirmed the fracture of the left pelvic ramus. The patient is sitting comfortably in bed no apparent distress. She is asking if she can get out of bed to walk. Past Medical History:   Diagnosis Date    Alzheimer disease ([address]     Atrial fibrillation ([address]     Atrial fibrillation ([address]     Dysphagia     Hypertension     Thyroid disease      Past Surgical History:   Procedure Laterality Date    HX OTHER SURGICAL      none      Family History   Problem Relation Age of Onset    Hypertension Mother      Social History     Tobacco Use    Smoking status: Never Smoker    Smokeless tobacco: Never Used   Substance Use Topics    Alcohol use: Not Currently      Prior to Admission medications    Medication Sig Start Date End Date Taking? Authorizing Provider   oxybutynin (DITROPAN) 5 mg tablet Take 5 mg by mouth three (3) times daily. Yes Provider, Historical   senna (Senna) 8.6 mg tablet Take 1 Tablet by mouth daily. Yes Provider, Historical   levothyroxine (SYNTHROID) 75 mcg tablet Take 75 mcg by mouth Daily (before breakfast). Yes Provider, Historical   amLODIPine (Norvasc) 5 mg tablet Take 5 mg by mouth daily. Yes Provider, Historical   triamcinolone acetonide (KENALOG) 0.1 % ointment Apply  to affected area two (2) times a day.  use thin layer   Yes Provider, Historical guaiFENesin (ROBITUSSIN) 100 mg/5 mL liquid Take 200 mg by mouth three (3) times daily as needed for Cough. Provider, Historical   peg 400-propylene glycol (Systane, propylene glycoL,) 0.4-0.3 % drop 2 Drops as needed. Provider, Historical       No Known Allergies    Review of Systems:  Review of Systems   Unable to perform ROS: Dementia         Objective:     Current Facility-Administered Medications   Medication Dose Route Frequency    amLODIPine (NORVASC) tablet 5 mg  5 mg Oral DAILY    levothyroxine (SYNTHROID) tablet 75 mcg  75 mcg Oral ACB    oxybutynin (DITROPAN) tablet 5 mg  5 mg Oral TID    sodium chloride (NS) flush 5-40 mL  5-40 mL IntraVENous Q8H    sodium chloride (NS) flush 5-40 mL  5-40 mL IntraVENous PRN    acetaminophen (TYLENOL) tablet 650 mg  650 mg Oral Q6H PRN    Or    acetaminophen (TYLENOL) suppository 650 mg  650 mg Rectal Q6H PRN    polyethylene glycol (MIRALAX) packet 17 g  17 g Oral DAILY PRN    ondansetron (ZOFRAN ODT) tablet 4 mg  4 mg Oral Q8H PRN    Or    ondansetron (ZOFRAN) injection 4 mg  4 mg IntraVENous Q6H PRN    traMADoL (ULTRAM) tablet 50 mg  50 mg Oral Q6H PRN      Vitals:    08/05/21 1955 08/05/21 2100 08/06/21 0238 08/06/21 0843   BP: (!) 147/66 (!) 152/74 130/71 137/71   Pulse: 70 76 67 61   Resp: 20 18 16 16   Temp:  98.1 °F (36.7 °C) 97.7 °F (36.5 °C) 98.3 °F (36.8 °C)   SpO2: 98% 94% 95% 93%   Weight:       Height:            Alert, No apparent distress    Physical Exam:  Lower extremities: There is no foreshortening or malrotation seen of either extremity. Sensation intact throughout bilateral lower extremities. There was mild tenderness to abduction of her left lower extremity past 20 degrees. No tenderness to internal/external rotation. No tenderness to passive flexion of either hip. No calf pain to palpation. EHL/DF/PF is 5 out of 5. Cap refill is 2 seconds. DP/PT pulses palpable.   Bilateral lower extremities appear neurovascularly intact. Labs:  CBC:  Recent Labs     08/06/21  0743 08/05/21  1630   WBC 8.1 8.9   RBC 3.44* 3.90   HGB 12.0 13.6   HCT 35.5 40.0   .2* 102.6*   RDW 15.3* 15.3*   * 151     CHEMISTRIES:  Recent Labs     08/06/21  0743 08/05/21  1630    140   K 3.2* 3.2*    105   CO2 29 30   BUN 14 14   CREA 0.62 0.71   CA 8.2* 8.6   PT/INR:  Recent Labs     08/05/21  1630   INR 1.1     APTT:  Recent Labs     08/05/21  1630   APTT 32.0     LIVER PROFILE:  Recent Labs     08/06/21  0743 08/05/21  1630   AST 19 23   ALT 17 21       IMAGING:  CT scan of abdomen pelvis taken at [facility] shows a left inferior pelvic ramus and ischium fracture with mild adjacent hematoma. There is a right superior pubic ramus fracture. 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 Assessment/Plan:     Hospital Problems  Never Reviewed          Codes Class Noted POA    Pelvic fracture [address] [address] ICD-10-CM: S32. 9XXA  ICD-9-CM: 808.8  8/5/2021 Unknown        Fall ICD-10-CM: W19. Jackelyn Sers  ICD-9-CM: E888.9  8/5/2021 Unknown              Pelvic ramus fracture  No acute orthopedic surgical intervention needed at this time. Patient can ambulate, weightbearing as tolerated. Nonacute left proximal humerus fracture  No acute acute orthopedic intervention needed. I will place a call to the patient's son Fahad Mcpherson to update. This patient was seen in direct consult with Dr. Venancio Goins. Thank you for the courtesy of this consult.     Reviewed and independently evaluated the patient and will switch her to [facility] with wheel chair for now and monitor the hematoma    Signed By: Summer Rodrigez PA-C     August 6, 2021

## 2021-08-06 NOTE — PROGRESS NOTES
Comprehensive Nutrition Assessment    Type and Reason for Visit: Initial (dysphagia)    Nutrition Recommendations/Plan:     Continue Pureed diet  Monitor need for SLP eval    Add Ensure Pdg BID  Add yogurt daily    Document %meal and supplement intakes, BMs in I/Os    Nutrition Assessment:  Admitted for pelvic ramus fx s/p GLF at [facility]. Ortho following, denied acute surgical needs. On observation status. RD monitoring for dysphagia. Ordered Pureed/thin diet. Intakes 25% observed. Pt willing to take bites of pudding with RD when present. Will add additional Ensure pdg to assist with meeting est needs. Pt unable to relay preferences, only nodded when asked if pudding was good. Labs: K 3.2, Ca 8.2. Meds: amlodipine, oxybutynin, tramadol, miralax PRN. Malnutrition Assessment:  Malnutrition Status:  Mild malnutrition    Context:  Acute illness     Findings of the 6 clinical characteristics of malnutrition:   Energy Intake:  1 - 75% or less of est energy req for 7 or more days (suspected)  Weight Loss:  No significant weight loss     Body Fat Loss:  No significant body fat loss,     Muscle Mass Loss:  No significant muscle mass loss,    Fluid Accumulation:  No significant fluid accumulation,        Estimated Daily Nutrient Needs:  Energy (kcal): 1300kcal (25kcal/kg); Weight Used for Energy Requirements: Current  Protein (g): 52g (1g/kg); Weight Used for Protein Requirements: Current  Fluid (ml/day): 1300mL; Method Used for Fluid Requirements: 1 ml/kcal      Nutrition Related Findings:  NFPE unremarkable. +Edentulism. On Pureed diet, suspected as home texture diet. No current n/v or c/d. No BM yet recorded since admit. No edema.       Wounds:    None (bruises from fall)       Current Nutrition Therapies:  ADULT DIET Dysphagia - Pureed    Anthropometric Measures:  · Height:  5' (152.4 cm)  · Current Body Wt:  51.9 kg (114 lb 6.7 oz)   · Admission Body Wt:  115 lb (stated)    · Usual Body Wt:  52.2 kg (115 lb)     · Ideal Body Wt:  100 lbs:  114.4 %   · BMI Category:  Normal weight (BMI 22.0-24.9) age over 72     Wt Readings from Last 5 Encounters:   08/05/21 52.2 kg (115 lb)   08/05/21 52.2 kg (115 lb)       Nutrition Diagnosis:   · Biting/chewing (masticatory) difficulty related to partial or complete edentulism as evidenced by  (Pureed diet per MD)      Nutrition Interventions:   Food and/or Nutrient Delivery: Continue current diet, Start oral nutrition supplement  Nutrition Education and Counseling: Education not indicated  Coordination of Nutrition Care: Continue to monitor while inpatient, Feeding assistance/environmental change (SLP eval as needed)    Goals:  Intakes >50% x 7 days, Wt maintenance +/-1kg, BMs every 1-3 days       Nutrition Monitoring and Evaluation:   Behavioral-Environmental Outcomes: None identified  Food/Nutrient Intake Outcomes: Food and nutrient intake, Supplement intake  Physical Signs/Symptoms Outcomes: GI status, Weight, Chewing or swallowing    Discharge Planning:    No discharge needs at this time     Electronically signed by Rossy Stapleton on 8/6/2021 at 2:47 PM    Contact: EXT [phone] or via [employer]

## 2021-08-07 ENCOUNTER — APPOINTMENT (OUTPATIENT)
Dept: GENERAL RADIOLOGY | Age: 86
DRG: 536 | End: 2021-08-07
Attending: FAMILY MEDICINE
Payer: MEDICARE

## 2021-08-07 LAB
ALBUMIN SERPL-MCNC: 2.9 G/DL (ref 3.5–5)
ALBUMIN/GLOB SERPL: 0.9 {RATIO} (ref 1.1–2.2)
ALP SERPL-CCNC: 112 U/L (ref 45–117)
ALT SERPL-CCNC: 16 U/L (ref 12–78)
ANION GAP SERPL CALC-SCNC: 8 MMOL/L (ref 5–15)
AST SERPL W P-5'-P-CCNC: 19 U/L (ref 15–37)
BACTERIA SPEC CULT: NORMAL
BASOPHILS # BLD: 0 K/UL (ref 0–0.1)
BASOPHILS NFR BLD: 0 % (ref 0–1)
BILIRUB SERPL-MCNC: 0.9 MG/DL (ref 0.2–1)
BUN SERPL-MCNC: 15 MG/DL (ref 6–20)
BUN/CREAT SERPL: 25 (ref 12–20)
CA-I BLD-MCNC: 8.4 MG/DL (ref 8.5–10.1)
CHLORIDE SERPL-SCNC: 107 MMOL/L (ref 97–108)
CO2 SERPL-SCNC: 26 MMOL/L (ref 21–32)
COLONY COUNT,CNT: NORMAL
CREAT SERPL-MCNC: 0.59 MG/DL (ref 0.55–1.02)
DIFFERENTIAL METHOD BLD: ABNORMAL
EOSINOPHIL # BLD: 0.2 K/UL (ref 0–0.4)
EOSINOPHIL NFR BLD: 3 % (ref 0–7)
ERYTHROCYTE [DISTWIDTH] IN BLOOD BY AUTOMATED COUNT: 15.2 % (ref 11.5–14.5)
GLOBULIN SER CALC-MCNC: 3.2 G/DL (ref 2–4)
GLUCOSE SERPL-MCNC: 91 MG/DL (ref 65–100)
HCT VFR BLD AUTO: 35.9 % (ref 35–47)
HGB BLD-MCNC: 12.1 G/DL (ref 11.5–16)
IMM GRANULOCYTES # BLD AUTO: 0 K/UL (ref 0–0.04)
IMM GRANULOCYTES NFR BLD AUTO: 1 % (ref 0–0.5)
LYMPHOCYTES # BLD: 0.8 K/UL (ref 0.8–3.5)
LYMPHOCYTES NFR BLD: 9 % (ref 12–49)
MCH RBC QN AUTO: 35 PG (ref 26–34)
MCHC RBC AUTO-ENTMCNC: 33.7 G/DL (ref 30–36.5)
MCV RBC AUTO: 103.8 FL (ref 80–99)
MONOCYTES # BLD: 0.6 K/UL (ref 0–1)
MONOCYTES NFR BLD: 7 % (ref 5–13)
NEUTS SEG # BLD: 6.3 K/UL (ref 1.8–8)
NEUTS SEG NFR BLD: 80 % (ref 32–75)
NRBC # BLD: 0 K/UL (ref 0–0.01)
NRBC BLD-RTO: 0 PER 100 WBC
PLATELET # BLD AUTO: 136 K/UL (ref 150–400)
PMV BLD AUTO: 12.4 FL (ref 8.9–12.9)
POTASSIUM SERPL-SCNC: 3.3 MMOL/L (ref 3.5–5.1)
PROT SERPL-MCNC: 6.1 G/DL (ref 6.4–8.2)
RBC # BLD AUTO: 3.46 M/UL (ref 3.8–5.2)
SODIUM SERPL-SCNC: 141 MMOL/L (ref 136–145)
SPECIAL REQUESTS,SREQ: NORMAL
WBC # BLD AUTO: 8 K/UL (ref 3.6–11)

## 2021-08-07 PROCEDURE — U0003 INFECTIOUS AGENT DETECTION BY NUCLEIC ACID (DNA OR RNA); SEVERE ACUTE RESPIRATORY SYNDROME CORONAVIRUS 2 (SARS-COV-2) (CORONAVIRUS DISEASE [COVID-19]), AMPLIFIED PROBE TECHNIQUE, MAKING USE OF HIGH THROUGHPUT TECHNOLOGIES AS DESCRIBED BY CMS-2020-01-R: HCPCS

## 2021-08-07 PROCEDURE — 74011250637 HC RX REV CODE- 250/637: Performed by: FAMILY MEDICINE

## 2021-08-07 PROCEDURE — 97161 PT EVAL LOW COMPLEX 20 MIN: CPT

## 2021-08-07 PROCEDURE — 74011250637 HC RX REV CODE- 250/637: Performed by: PHYSICIAN ASSISTANT

## 2021-08-07 PROCEDURE — 80053 COMPREHEN METABOLIC PANEL: CPT

## 2021-08-07 PROCEDURE — 85025 COMPLETE CBC W/AUTO DIFF WBC: CPT

## 2021-08-07 PROCEDURE — 36415 COLL VENOUS BLD VENIPUNCTURE: CPT

## 2021-08-07 PROCEDURE — 97530 THERAPEUTIC ACTIVITIES: CPT

## 2021-08-07 PROCEDURE — 99218 HC RM OBSERVATION: CPT

## 2021-08-07 PROCEDURE — 65270000029 HC RM PRIVATE

## 2021-08-07 PROCEDURE — 74018 RADEX ABDOMEN 1 VIEW: CPT

## 2021-08-07 RX ORDER — POTASSIUM CHLORIDE 1.5 G/1.77G
20 POWDER, FOR SOLUTION ORAL DAILY
Qty: 4 PACKET | Refills: 0 | Status: SHIPPED
Start: 2021-08-08 | End: 2021-08-12

## 2021-08-07 RX ORDER — TRAMADOL HYDROCHLORIDE 50 MG/1
50 TABLET ORAL
Qty: 12 TABLET | Refills: 0 | Status: SHIPPED | OUTPATIENT
Start: 2021-08-07 | End: 2021-08-10

## 2021-08-07 RX ORDER — POLYETHYLENE GLYCOL 3350 17 G/17G
17 POWDER, FOR SOLUTION ORAL DAILY
Qty: 30 PACKET | Refills: 0 | Status: SHIPPED
Start: 2021-08-07 | End: 2021-09-06

## 2021-08-07 RX ADMIN — LEVOTHYROXINE SODIUM 75 MCG: 0.07 TABLET ORAL at 09:54

## 2021-08-07 RX ADMIN — TRAMADOL HYDROCHLORIDE 50 MG: 50 TABLET, FILM COATED ORAL at 20:06

## 2021-08-07 RX ADMIN — Medication 10 ML: at 05:06

## 2021-08-07 RX ADMIN — OXYBUTYNIN CHLORIDE 5 MG: 5 TABLET ORAL at 20:06

## 2021-08-07 RX ADMIN — SENNOSIDES 8.6 MG: 8.6 TABLET, FILM COATED ORAL at 09:56

## 2021-08-07 RX ADMIN — POTASSIUM CHLORIDE 20 MEQ: 1.5 FOR SOLUTION ORAL at 09:56

## 2021-08-07 RX ADMIN — Medication 10 ML: at 13:22

## 2021-08-07 RX ADMIN — Medication 10 ML: at 20:03

## 2021-08-07 RX ADMIN — AMLODIPINE BESYLATE 5 MG: 5 TABLET ORAL at 09:56

## 2021-08-07 RX ADMIN — OXYBUTYNIN CHLORIDE 5 MG: 5 TABLET ORAL at 09:56

## 2021-08-07 RX ADMIN — LEVOTHYROXINE SODIUM 75 MCG: 0.07 TABLET ORAL at 09:56

## 2021-08-07 NOTE — PROGRESS NOTES
Problem: Falls - Risk of  Goal: *Absence of Falls  Description: Document Pam Pillow Fall Risk and appropriate interventions in the flowsheet.   Outcome: Progressing Towards Goal  Note: Fall Risk Interventions:  Mobility Interventions: Bed/chair exit alarm, Patient to call before getting OOB, PT Consult for mobility concerns    Mentation Interventions: Bed/chair exit alarm         Elimination Interventions: Bed/chair exit alarm, Call light in reach, Stay With Me (per policy), Toilet paper/wipes in reach, Toileting schedule/hourly rounds    History of Falls Interventions: Bed/chair exit alarm, Room close to nurse's station         Problem: Patient Education: Go to Patient Education Activity  Goal: Patient/Family Education  Outcome: Progressing Towards Goal

## 2021-08-07 NOTE — PROGRESS NOTES
Bedside shift change report given to Yahaira Woodruff RN (oncoming nurse) by Samantha Crow LPN (offgoing nurse). Report included the following information SBAR, Intake/Output and Recent Results.

## 2021-08-07 NOTE — PROGRESS NOTES
ALPHONSE received telephone call back from nurse Renee Arreaga at [facility]. Renee Arreaga stated there director of nursing would like a repeat hemoglobin due to it decreasing 1.6G in 24 hours. CM informed the nurse that it now has been stable for 24 Hours, still they want it repeat. They require a PCR covid test to return. CM ordered PCR Rapid test. [facility] had concerns of the low potassium of 3.3. Renee Arreaga at the [facility] stated that she may not be able to come until Monday, also due to no business office staff there during the weekend to determine if patient will be skilled or admitted to long term care. CM obtaining a lot of pushback from the SNF to accept the patient today, no room assignment given at this time. CM notified attending, CM entered discharge delay at this time.

## 2021-08-07 NOTE — PROGRESS NOTES
CM acknowledge discharge order. Patient is a resident at [facility] located at [address], [address]. CM called and spoke with head nurse Chari Sweet at [phone], She requested clinicals be faxed to her for review prior to giving a room assignment. CM faxed clinicals to [phone], CM awaiting telephone call back for confirmation patient can return and room assignment, CM will notify nurse once bed assignment is confirmed.

## 2021-08-07 NOTE — PROGRESS NOTES
Problem: Mobility Impaired (Adult and Pediatric)  Goal: *Acute Goals and Plan of Care (Insert Text)  Description: Physical Therapy Goals  Initiated 8/7/21  1)  Pt to participate in 3-5 LE exercises in 7 days to improve overall functional mobility. 2)  Bed mobility with min A in 7 days to prevent skin breakdown. 3)  Pt to perform stand pivot transfer from bed to chair with CGA in 7 days. 3)  Pt to amb 10ft with LRAD and Wendy in 7 days, WBAT on LLE. Pt. Goal:  Pt to be able to walk safely without falls. Outcome: Not Met  PHYSICAL THERAPY EVALUATION  Patient: Clementina Arredondo (89 y.o. female)  Date: 8/7/2021  Primary Diagnosis: Pelvic fracture ([address] [S32. 9XXA]  Fall [W19. XXXA]        Precautions: fall/L pelvic fx/ chronic L shoulder fracture, WBAT       ASSESSMENT  Pt admitted for GLF at [facility] with resulting L pelvic ramus fracture. Pt with chronic L shoulder fx from a fall 3 years ago. Pt is currently WBAT on LLE following ortho consult. No surgical intervention will be done per ortho. Pt with hx xof HTN, Afib, Alzheimer's and [place]. Pt hears best from her L ear. Pt has been very drowsy today and nursing has had difficulty keeping her awake long enough to take her pain meds. Upon entry into pt's room, she was found sleeping, but was easily aroused but having difficulty answering my questions. Pt alert to name and place (replied \"hospital\") but not time or situation. After bringing in a RW and asking her if she would sit up, pt agreeable requiring mod A to for sup to sit transfer. Once almost in sitting, pt grabbed her L hip and reported mild pain. With increased time and encouragement, pt was able to come to sitting at EOB but still with impaired balance as she was leaning more onto the R hip due to pain in L hip. Pt stated that she was able to dress herself and feed herself at the nursing home and that she walked with a RW and was rarely in a w/c.   Pt able to perform sit to stand transfer with a pull to stand using RW and mod A by therapist.  Pt with pain in L hip upon standing and unable to  R foot to take side steps. She was able to scoot R foot to the side for a few steps, but was leaning heavily on the walker with a narrow MICHAEL and not safe for chair transfer. Upon standing, noted that pt's radha pad was wet, so had her sit back down, obtained clean radha pad and had her stand again to place radha pad underneath her. Pt requiring mod A with all bed mobility and transfers today and unable to ambulate due to pain and guarding with LLE. Pt would benefit from  skilled PT services to improve overall functional mobility and progress gait if pt can tolerate. Recommend d/c back to facility upon discharge. Other factors to consider for discharge: impaired mobility, level of assist     Patient will benefit from skilled therapy intervention to address the above noted impairments. PLAN :  Recommendations and Planned Interventions: bed mobility training, transfer training, gait training, therapeutic exercises, patient and family training/education, and therapeutic activities      Frequency/Duration: Patient will be followed by physical therapy:  5 times a week to address goals. Recommendation for discharge: (in order for the patient to meet his/her long term goals)  LTC facility    This discharge recommendation:  Has been made in collaboration with the attending provider and/or case management    IF patient discharges home will need the following DME: none         SUBJECTIVE:   Patient stated yes, I was walking.     OBJECTIVE DATA SUMMARY:   HISTORY:    Past Medical History:   Diagnosis Date    Alzheimer disease [address] [address]     Atrial fibrillation ([facility] [address]     Atrial fibrillation ([facility] [address]     Dysphagia     Hypertension     Thyroid disease      Past Surgical History:   Procedure Laterality Date    HX OTHER SURGICAL      none       Personal factors and/or comorbidities impacting plan of care: impaired mobility, level of assist    Home Situation  Home Environment: Long term care  Care Facility Name: [facility]  One/Two Story Residence: Other (Comment) (UNKNOWN)  Living Alone: No  Support Systems: Skilled nursing facility  Patient Expects to be Discharged to[de-identified] Skilled nursing facility  Current DME Used/Available at Home: Walker, rolling    PLOF: Pt A for ADLS/IADLS, A with mobility prior to admission. EXAMINATION/PRESENTATION/DECISION MAKING:   Critical Behavior:  Neurologic State: Alert, Drowsy  Orientation Level: Oriented to person, Disoriented to time, Disoriented to situation, Oriented to place  Cognition: Decreased attention/concentration, Decreased command following     Hearing: Auditory  Auditory Impairment: Hard of hearing, bilateral  Range Of Motion:  AROM: Generally decreased, functional      Minimally decreased in B hips and knees                 Strength:    Strength: Generally decreased, functional      Grossly 3-/5 throughout Le's. Pt unable to participate in MMT due to cognitive status. Functional Mobility:  Bed Mobility:  Rolling: Minimum assistance  Supine to Sit: Moderate assistance  Sit to Supine: Moderate assistance  Scooting: Maximum assistance  Transfers:  Sit to Stand: Moderate assistance  Stand to Sit: Moderate assistance                       Balance:   Sitting: Impaired  Sitting - Static: Fair (occasional)  Sitting - Dynamic: Poor (constant support)  Standing: Impaired  Standing - Static: Constant support;Poor  Standing - Dynamic : Constant support;Poor  Ambulation/Gait Training:  Distance (ft): 1 Feet (ft)  Assistive Device: Walker, rolling;Gait belt  Ambulation - Level of Assistance:  Moderate assistance     Gait Description (WDL): Exceptions to WDL     Right Side Weight Bearing: Full  Left Side Weight Bearing: As tolerated  Base of Support: Narrowed     Speed/Pat: Shuffled                       Functional Measure:    [facility] [address] AM-PAC 6 Clicks         Basic Mobility Inpatient Short Form  How much difficulty does the patient currently have. .. Unable A Lot A Little None   1. Turning over in bed (including adjusting bedclothes, sheets and blankets)? [] 1   [] 2   [x] 3   [] 4   2. Sitting down on and standing up from a chair with arms ( e.g., wheelchair, bedside commode, etc.)   [] 1   [x] 2   [] 3   [] 4   3. Moving from lying on back to sitting on the side of the bed? [] 1   [x] 2   [] 3   [] 4          How much help from another person does the patient currently need. .. Total A Lot A Little None   4. Moving to and from a bed to a chair (including a wheelchair)? [] 1   [x] 2   [] 3   [] 4   5. Need to walk in hospital room? [] 1   [x] 2   [] 3   [] 4   6. Climbing 3-5 steps with a railing? [x] 1   [] 2   [] 3   [] 4   © 2007, Trustees of [address] [place] [address] [address] [id number], under license to [employer]. All rights reserved     Score:  Initial: 12 Most Recent: X (Date:8/7/21)   Interpretation of Tool:  Represents activities that are increasingly more difficult (i.e. Bed mobility, Transfers, Gait). Score 24 23 22-20 19-15 14-10 9-7 6   Modifier CH CI CJ CK CL CM CN          Physical Therapy Evaluation Charge Determination   History Examination Presentation Decision-Making   MEDIUM  Complexity : 1-2 comorbidities / personal factors will impact the outcome/ POC  MEDIUM Complexity : 3 Standardized tests and measures addressing body structure, function, activity limitation and / or participation in recreation  LOW Complexity : Stable, uncomplicated  Other Functional Measure [facility] 6 low      Based on the above components, the patient evaluation is determined to be of the following complexity level: LOW     Pain Rating:  3/10 in L hip    Activity Tolerance:   Fair and requires rest breaks  Please refer to the flowsheet for vital signs taken during this treatment.     After treatment patient left in no apparent distress:   Supine in bed, Call bell within reach, and Side rails x 3    COMMUNICATION/EDUCATION:   The patients plan of care was discussed with: Registered nurse. Patient/family agree to work toward stated goals and plan of care.     Thank you for this referral.  Cleaster Bumpers   Time Calculation: 24 mins

## 2021-08-07 NOTE — CONSULTS
ORTHOPEDIC CONSULT    Patient: Park Newell MRN: [id number]  SSN: [id number]    YOB: 1917  Age: 80 y.o. Sex: female      Subjective:      Park Newell is a 80 y.o. female who is being seen in orthopedic consultation for evaluation of pelvic ramus fracture. The patient is hard of hearing, Alzheimer's, poor historian. History obtained from chart. Apparently the patient had a ground-level fall on 8/4/2021 at her nursing home. [facility] patient had portable x-rays performed that did not show any acute fractures. Apparently she continued to have pain and was reevaluated CT scan was done and confirmed the fracture of the left pelvic ramus. The patient is sitting comfortably in bed no apparent distress. She is asking if she can get out of bed to walk. Past Medical History:   Diagnosis Date    Alzheimer disease ([address]     Atrial fibrillation ([address]     Atrial fibrillation ([address]     Dysphagia     Hypertension     Thyroid disease      Past Surgical History:   Procedure Laterality Date    HX OTHER SURGICAL      none      Family History   Problem Relation Age of Onset    Hypertension Mother      Social History     Tobacco Use    Smoking status: Never Smoker    Smokeless tobacco: Never Used   Substance Use Topics    Alcohol use: Not Currently      Prior to Admission medications    Medication Sig Start Date End Date Taking? Authorizing Provider   traMADoL (ULTRAM) 50 mg tablet Take 1 Tablet by mouth every six (6) hours as needed for Pain for up to 3 days. Max Daily Amount: 200 mg. 8/7/21 8/10/21 Yes Andrew Torey, NP   potassium chloride (KLOR-CON) 20 mEq pack Take 1 Packet by mouth daily for 4 days. 8/8/21 8/12/21 Yes Andrew Torey, NP   polyethylene glycol (MIRALAX) 17 gram packet Take 1 Packet by mouth daily for 30 days. 8/7/21 9/6/21 Yes Andrew Torey NP   oxybutynin (DITROPAN) 5 mg tablet Take 5 mg by mouth three (3) times daily.    Yes Provider, Historical   senna (Senna) 8.6 mg tablet Take 1 Tablet by mouth daily. Yes Provider, Historical   levothyroxine (SYNTHROID) 75 mcg tablet Take 75 mcg by mouth Daily (before breakfast). Yes Provider, Historical   amLODIPine (Norvasc) 5 mg tablet Take 5 mg by mouth daily. Yes Provider, Historical   triamcinolone acetonide (KENALOG) 0.1 % ointment Apply  to affected area two (2) times a day. use thin layer   Yes Provider, Historical   guaiFENesin (ROBITUSSIN) 100 mg/5 mL liquid Take 200 mg by mouth three (3) times daily as needed for Cough. Provider, Historical   peg 400-propylene glycol (Systane, propylene glycoL,) 0.4-0.3 % drop 2 Drops as needed.     Provider, Historical       No Known Allergies    Review of Systems:  Review of Systems   Unable to perform ROS: Dementia         Objective:     Current Facility-Administered Medications   Medication Dose Route Frequency    oxybutynin (DITROPAN) tablet 5 mg  5 mg Oral BID    potassium chloride (KLOR-CON) packet for solution 20 mEq  20 mEq Oral DAILY    senna (SENOKOT) tablet 8.6 mg  1 Tablet Oral DAILY    amLODIPine (NORVASC) tablet 5 mg  5 mg Oral DAILY    levothyroxine (SYNTHROID) tablet 75 mcg  75 mcg Oral ACB    sodium chloride (NS) flush 5-40 mL  5-40 mL IntraVENous Q8H    sodium chloride (NS) flush 5-40 mL  5-40 mL IntraVENous PRN    acetaminophen (TYLENOL) tablet 650 mg  650 mg Oral Q6H PRN    Or    acetaminophen (TYLENOL) suppository 650 mg  650 mg Rectal Q6H PRN    polyethylene glycol (MIRALAX) packet 17 g  17 g Oral DAILY PRN    ondansetron (ZOFRAN ODT) tablet 4 mg  4 mg Oral Q8H PRN    Or    ondansetron (ZOFRAN) injection 4 mg  4 mg IntraVENous Q6H PRN    traMADoL (ULTRAM) tablet 50 mg  50 mg Oral Q6H PRN      Vitals:    08/06/21 2221 08/07/21 0309 08/07/21 0845 08/07/21 1450   BP: (!) 142/66 (!) 140/68 (!) 149/75 (!) 160/81   Pulse: 76 75 63 74   Resp: 18 20 18 18   Temp: 98.2 °F (36.8 °C) 98.6 °F (37 °C) 97.7 °F (36.5 °C) 98.2 °F (36.8 °C)   SpO2: 95%  95% 92%   Weight:       Height:            Alert, No apparent distress    Physical Exam:  Lower extremities: There is no foreshortening or malrotation seen of either extremity. Sensation intact throughout bilateral lower extremities. There was mild tenderness to abduction of her left lower extremity past 20 degrees. No tenderness to internal/external rotation. No tenderness to passive flexion of either hip. No calf pain to palpation. EHL/DF/PF is 5 out of 5. Cap refill is 2 seconds. DP/PT pulses palpable. Bilateral lower extremities appear neurovascularly intact. Labs:  CBC:  Recent Labs     08/07/21  0915 08/06/21  0743 08/05/21  1630   WBC 8.0 8.1 8.9   RBC 3.46* 3.44* 3.90   HGB 12.1 12.0 13.6   HCT 35.9 35.5 40.0   .8* 103.2* 102.6*   RDW 15.2* 15.3* 15.3*   * 143* 151     CHEMISTRIES:  Recent Labs     08/07/21  0915 08/06/21  0743 08/05/21  1630    141 140   K 3.3* 3.2* 3.2*    107 105   CO2 26 29 30   BUN 15 14 14   CREA 0.59 0.62 0.71   CA 8.4* 8.2* 8.6   PT/INR:  Recent Labs     08/05/21  1630   INR 1.1     APTT:  Recent Labs     08/05/21  1630   APTT 32.0     LIVER PROFILE:  Recent Labs     08/07/21  0915 08/06/21  0743 08/05/21  1630   AST 19 19 23   ALT 16 17 21       IMAGING:  CT scan of abdomen pelvis taken at [facility] shows a left inferior pelvic ramus and ischium fracture with mild adjacent hematoma. There is a right superior pubic ramus fracture. 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 Assessment/Plan:     Hospital Problems  Never Reviewed        Codes Class Noted POA    Pelvic fracture ([facility] [address] ICD-10-CM: S32. 9XXA  ICD-9-CM: 808.8  8/5/2021 Unknown        Fall ICD-10-CM: W19. Barbara Nikolay  ICD-9-CM: E888.9  8/5/2021 Unknown            Pelvic ramus fracture  No acute orthopedic surgical intervention needed at this time.      Nonacute left proximal humerus fracture  No acute acute orthopedic intervention needed. Thank you for the courtesy of this consult.     Reviewed and independently evaluated the patient and will switch her to [facility] with wheel chair for now based on proximity to the hematoma     Signed By: Penelope Cohn MD     August 7, 2021

## 2021-08-07 NOTE — DISCHARGE SUMMARY
Admit date: 8/5/2021   Admitting Provider: Nimo Crocker MD    Discharge date: 8/7/2021  Discharging Provider: Regis Zhang NP      * Admission Diagnoses: Pelvic fracture ([facility] [address] [S32. 9XXA]  Fall [W19. XXXA]    * Discharge Diagnoses:    Hospital Problems as of 8/7/2021 Never Reviewed        Codes Class Noted - Resolved POA    Pelvic fracture ([address] ICD-10-CM: S32. 9XXA  ICD-9-CM: 808.8  8/5/2021 - Present Unknown        Fall ICD-10-CM: W19. Severa Reyes  ICD-9-CM: E888.9  8/5/2021 - Present Unknown            Alexandra Yao is a 80 y.o. female who presents to the emergency department after a ground-level fall 2020 4 hours ago. Initial x-rays did not reveal any obvious fractures. Patient has a chronic left shoulder fracture from a fall 3 years ago. . CT scan of the abdomen pelvis was performed for further evaluation revealing multiple pelvic fractures mainly on the left and not involving the hips. There was a adjacent hematoma present. Orthopedics has been contacted and would like the patient admitted for observation. CT of the head with no acute intracranial abnormality. Patient is from [facility]. Have discussed the case with the patient's power of , son Radha Byrd at home phone number [phone] Course: Orthopedics consulted for management of left proximal humerus fracture. No surgical intervention. Patient can ambulate, weightbearing as tolerated. As needed pain management with tramadol. Outpatient follow-up with orthopedics in 2 weeks. * Procedures:   * No surgery found *      Consults: Orthopedic Surgery    Significant Diagnostic Studies:   CT scan of abdomen pelvis taken at [facility] shows a left inferior pelvic ramus and ischium fracture with mild adjacent hematoma. There is a right superior pubic ramus fracture.     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     Discharge Exam:  Physical Exam  Vitals and nursing note reviewed. Constitutional:       Appearance: Normal appearance. HENT:      Head: Normocephalic. Nose: Nose normal.      Mouth/Throat:      Mouth: Mucous membranes are moist.   Eyes:      Extraocular Movements: Extraocular movements intact. Cardiovascular:      Rate and Rhythm: Normal rate and regular rhythm. Pulses: Normal pulses. Heart sounds: Normal heart sounds. Pulmonary:      Effort: Pulmonary effort is normal.      Breath sounds: Normal breath sounds. Abdominal:      General: Bowel sounds are normal.      Palpations: Abdomen is soft. Musculoskeletal:      Cervical back: Normal range of motion. Comments: Limited ROM BLE, pain bilateral legs with abduction   Skin:     General: Skin is warm and dry. Capillary Refill: Capillary refill takes less than 2 seconds. Neurological:      Mental Status: She is alert. Mental status is at baseline. Psychiatric:         Mood and Affect: Mood normal.         Behavior: Behavior normal.         * Discharge Condition: stable  * Disposition: [place] ([facility])    Discharge Medications:  Current Discharge Medication List      START taking these medications    Details   traMADoL (ULTRAM) 50 mg tablet Take 1 Tablet by mouth every six (6) hours as needed for Pain for up to 3 days. Max Daily Amount: 200 mg. Qty: 12 Tablet, Refills: 0  Start date: 8/7/2021, End date: 8/10/2021    Associated Diagnoses: Closed nondisplaced fracture of pelvis, unspecified part of pelvis, initial encounter ([address]      potassium chloride (KLOR-CON) 20 mEq pack Take 1 Packet by mouth daily for 4 days. Qty: 4 Packet, Refills: 0  Start date: 8/8/2021, End date: 8/12/2021      polyethylene glycol (MIRALAX) 17 gram packet Take 1 Packet by mouth daily for 30 days.   Qty: 30 Packet, Refills: 0  Start date: 8/7/2021, End date: 9/6/2021         CONTINUE these medications which have NOT CHANGED    Details   oxybutynin (DITROPAN) 5 mg tablet Take 5 mg by mouth three (3) times daily. senna (Senna) 8.6 mg tablet Take 1 Tablet by mouth daily. levothyroxine (SYNTHROID) 75 mcg tablet Take 75 mcg by mouth Daily (before breakfast). amLODIPine (Norvasc) 5 mg tablet Take 5 mg by mouth daily. triamcinolone acetonide (KENALOG) 0.1 % ointment Apply  to affected area two (2) times a day. use thin layer      peg 400-propylene glycol (Systane, propylene glycoL,) 0.4-0.3 % drop 2 Drops as needed. STOP taking these medications       guaiFENesin (ROBITUSSIN) 100 mg/5 mL liquid Comments:   Reason for Stopping:               * Follow-up Care/Patient Instructions: Activity: Activity as tolerated and PT/OT Eval and Treat  Diet: Cardiac Diet  Wound Care: None needed    Patient to continue all medication as prescribed  Patient maintain all follow-up appointments  Patient to ambulate weightbearing as tolerated  Patient medically stable for discharge once bed and ride is available    Follow-up Information     Follow up With Specialties Details Why Contact Marc Newell MD Family Medicine In 1 week  [facility] 9      Alex Arora MD Orthopedic Surgery, Sports Medicine In 2 weeks  [place] [address]  [phone]          Above discharge plan reviewed and discussed with Morteza Weaver, [phone] in detail, all questions answered.     Time Spent: > 35 minutes    Signed:  Diane Crawford NP  8/7/2021  10:16 AM

## 2021-08-08 LAB
ALBUMIN SERPL-MCNC: 2.8 G/DL (ref 3.5–5)
ALBUMIN/GLOB SERPL: 0.9 {RATIO} (ref 1.1–2.2)
ALP SERPL-CCNC: 114 U/L (ref 45–117)
ALT SERPL-CCNC: 18 U/L (ref 12–78)
ANION GAP SERPL CALC-SCNC: 5 MMOL/L (ref 5–15)
AST SERPL W P-5'-P-CCNC: 22 U/L (ref 15–37)
BASOPHILS # BLD: 0 K/UL (ref 0–0.1)
BASOPHILS NFR BLD: 1 % (ref 0–1)
BILIRUB SERPL-MCNC: 0.8 MG/DL (ref 0.2–1)
BUN SERPL-MCNC: 14 MG/DL (ref 6–20)
BUN/CREAT SERPL: 22 (ref 12–20)
CA-I BLD-MCNC: 8.4 MG/DL (ref 8.5–10.1)
CHLORIDE SERPL-SCNC: 107 MMOL/L (ref 97–108)
CO2 SERPL-SCNC: 30 MMOL/L (ref 21–32)
CREAT SERPL-MCNC: 0.63 MG/DL (ref 0.55–1.02)
DIFFERENTIAL METHOD BLD: ABNORMAL
EOSINOPHIL # BLD: 0.4 K/UL (ref 0–0.4)
EOSINOPHIL NFR BLD: 6 % (ref 0–7)
ERYTHROCYTE [DISTWIDTH] IN BLOOD BY AUTOMATED COUNT: 15.1 % (ref 11.5–14.5)
GLOBULIN SER CALC-MCNC: 3 G/DL (ref 2–4)
GLUCOSE SERPL-MCNC: 89 MG/DL (ref 65–100)
HCT VFR BLD AUTO: 34.5 % (ref 35–47)
HGB BLD-MCNC: 11.6 G/DL (ref 11.5–16)
IMM GRANULOCYTES # BLD AUTO: 0 K/UL (ref 0–0.04)
IMM GRANULOCYTES NFR BLD AUTO: 0 % (ref 0–0.5)
LYMPHOCYTES # BLD: 0.9 K/UL (ref 0.8–3.5)
LYMPHOCYTES NFR BLD: 13 % (ref 12–49)
MCH RBC QN AUTO: 35.5 PG (ref 26–34)
MCHC RBC AUTO-ENTMCNC: 33.6 G/DL (ref 30–36.5)
MCV RBC AUTO: 105.5 FL (ref 80–99)
MONOCYTES # BLD: 0.6 K/UL (ref 0–1)
MONOCYTES NFR BLD: 8 % (ref 5–13)
NEUTS SEG # BLD: 4.9 K/UL (ref 1.8–8)
NEUTS SEG NFR BLD: 72 % (ref 32–75)
NRBC # BLD: 0 K/UL (ref 0–0.01)
NRBC BLD-RTO: 0 PER 100 WBC
PLATELET # BLD AUTO: 168 K/UL (ref 150–400)
PMV BLD AUTO: 11.9 FL (ref 8.9–12.9)
POTASSIUM SERPL-SCNC: 3.7 MMOL/L (ref 3.5–5.1)
PROT SERPL-MCNC: 5.8 G/DL (ref 6.4–8.2)
RBC # BLD AUTO: 3.27 M/UL (ref 3.8–5.2)
SARS-COV-2, COV2: NORMAL
SODIUM SERPL-SCNC: 142 MMOL/L (ref 136–145)
WBC # BLD AUTO: 6.8 K/UL (ref 3.6–11)

## 2021-08-08 PROCEDURE — 85025 COMPLETE CBC W/AUTO DIFF WBC: CPT

## 2021-08-08 PROCEDURE — 74011250637 HC RX REV CODE- 250/637: Performed by: NURSE PRACTITIONER

## 2021-08-08 PROCEDURE — 36415 COLL VENOUS BLD VENIPUNCTURE: CPT

## 2021-08-08 PROCEDURE — 65270000029 HC RM PRIVATE

## 2021-08-08 PROCEDURE — 74011250637 HC RX REV CODE- 250/637: Performed by: FAMILY MEDICINE

## 2021-08-08 PROCEDURE — 74011250637 HC RX REV CODE- 250/637: Performed by: PHYSICIAN ASSISTANT

## 2021-08-08 PROCEDURE — 80053 COMPREHEN METABOLIC PANEL: CPT

## 2021-08-08 RX ADMIN — Medication 10 ML: at 20:43

## 2021-08-08 RX ADMIN — Medication 10 ML: at 05:03

## 2021-08-08 RX ADMIN — LACTULOSE 30 ML: 20 SOLUTION ORAL at 20:34

## 2021-08-08 RX ADMIN — POTASSIUM CHLORIDE 20 MEQ: 1.5 FOR SOLUTION ORAL at 09:52

## 2021-08-08 RX ADMIN — ACETAMINOPHEN 650 MG: 325 TABLET ORAL at 20:34

## 2021-08-08 RX ADMIN — OXYBUTYNIN CHLORIDE 5 MG: 5 TABLET ORAL at 20:34

## 2021-08-08 RX ADMIN — SENNOSIDES 8.6 MG: 8.6 TABLET, FILM COATED ORAL at 09:57

## 2021-08-08 RX ADMIN — LEVOTHYROXINE SODIUM 75 MCG: 0.07 TABLET ORAL at 09:57

## 2021-08-08 RX ADMIN — AMLODIPINE BESYLATE 5 MG: 5 TABLET ORAL at 09:52

## 2021-08-08 RX ADMIN — OXYBUTYNIN CHLORIDE 5 MG: 5 TABLET ORAL at 09:51

## 2021-08-08 RX ADMIN — LACTULOSE 30 ML: 20 SOLUTION ORAL at 12:21

## 2021-08-08 RX ADMIN — Medication 10 ML: at 15:06

## 2021-08-08 NOTE — DISCHARGE SUMMARY
Admit date: 8/5/2021   Admitting Provider: Felipe Pope MD    Discharge date: 8/8/2021  Discharging Provider: Nely San NP      * Admission Diagnoses: Pelvic fracture ([address] [S32. 9XXA]  Fall [W19. XXXA]    * Discharge Diagnoses:    Hospital Problems as of 8/8/2021 Never Reviewed        Codes Class Noted - Resolved POA    Pelvic fracture ([address] ICD-10-CM: S32. 9XXA  ICD-9-CM: 808.8  8/5/2021 - Present Unknown        Fall ICD-10-CM: W19Don Bro  ICD-9-CM: E888.9  8/5/2021 - Present Unknown            Ang Horan is a 80 y.o. female who presents to the emergency department after a ground-level fall 2020 4 hours ago. Initial x-rays did not reveal any obvious fractures. Patient has a chronic left shoulder fracture from a fall 3 years ago. . CT scan of the abdomen pelvis was performed for further evaluation revealing multiple pelvic fractures mainly on the left and not involving the hips. There was a adjacent hematoma present. Orthopedics has been contacted and would like the patient admitted for observation. CT of the head with no acute intracranial abnormality. Patient is from [facility]. Have discussed the case with the patient's power of , son Freida Adam at home phone number [phone] Course: Orthopedics consulted for management of left proximal humerus fracture. No surgical intervention. Patient can ambulate, weightbearing as tolerated. As needed pain management with tramadol. Outpatient follow-up with orthopedics in 2 weeks. * Procedures:   * No surgery found *      Consults: Orthopedic Surgery    Significant Diagnostic Studies:   CT scan of abdomen pelvis taken at [facility] shows a left inferior pelvic ramus and ischium fracture with mild adjacent hematoma. There is a right superior pubic ramus fracture.     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     Discharge Exam:  Physical Exam  Vitals and nursing note reviewed. Constitutional:       Appearance: Normal appearance. HENT:      Head: Normocephalic. Nose: Nose normal.      Mouth/Throat:      Mouth: Mucous membranes are moist.   Eyes:      Extraocular Movements: Extraocular movements intact. Cardiovascular:      Rate and Rhythm: Normal rate. Rhythm irregular. Pulses: Normal pulses. Heart sounds: Normal heart sounds. Pulmonary:      Effort: Pulmonary effort is normal.      Breath sounds: Normal breath sounds. Abdominal:      General: Bowel sounds are normal.      Palpations: Abdomen is soft. Musculoskeletal:      Cervical back: Normal range of motion. Comments: Limited ROM BLE, pain bilateral legs with abduction   Skin:     General: Skin is warm and dry. Capillary Refill: Capillary refill takes less than 2 seconds. Neurological:      Mental Status: She is alert. Mental status is at baseline. Psychiatric:         Mood and Affect: Mood normal.         Behavior: Behavior normal.         * Discharge Condition: stable  * Disposition: [place] ([facility])    Discharge Medications:  Current Discharge Medication List      START taking these medications    Details   traMADoL (ULTRAM) 50 mg tablet Take 1 Tablet by mouth every six (6) hours as needed for Pain for up to 3 days. Max Daily Amount: 200 mg. Qty: 12 Tablet, Refills: 0  Start date: 8/7/2021, End date: 8/10/2021    Associated Diagnoses: Closed nondisplaced fracture of pelvis, unspecified part of pelvis, initial encounter ([facility]      potassium chloride (KLOR-CON) 20 mEq pack Take 1 Packet by mouth daily for 4 days. Qty: 4 Packet, Refills: 0  Start date: 8/8/2021, End date: 8/12/2021      polyethylene glycol (MIRALAX) 17 gram packet Take 1 Packet by mouth daily for 30 days.   Qty: 30 Packet, Refills: 0  Start date: 8/7/2021, End date: 9/6/2021         CONTINUE these medications which have NOT CHANGED    Details   oxybutynin (DITROPAN) 5 mg tablet Take 5 mg by mouth three (3) times daily. senna (Senna) 8.6 mg tablet Take 1 Tablet by mouth daily. levothyroxine (SYNTHROID) 75 mcg tablet Take 75 mcg by mouth Daily (before breakfast). amLODIPine (Norvasc) 5 mg tablet Take 5 mg by mouth daily. triamcinolone acetonide (KENALOG) 0.1 % ointment Apply  to affected area two (2) times a day. use thin layer      peg 400-propylene glycol (Systane, propylene glycoL,) 0.4-0.3 % drop 2 Drops as needed. STOP taking these medications       guaiFENesin (ROBITUSSIN) 100 mg/5 mL liquid Comments:   Reason for Stopping:               * Follow-up Care/Patient Instructions: Activity: Activity as tolerated and PT/OT Eval and Treat  Diet: Cardiac Diet  Wound Care: None needed    Patient to continue all medication as prescribed  Patient maintain all follow-up appointments  Patient to ambulate weightbearing as tolerated  Patient medically stable for discharge once bed and ride is available    Follow-up Information     Follow up With Specialties Details Why Contact Shad Schwab MD Family Medicine In 1 week  [facility] 9      Jimi Davidson MD Orthopedic Surgery, Sports Medicine In 2 weeks  [place] [address]  [phone]          Patient remains medically stable for discharge pending acceptance back to SNF. Disposition pending covid results.      Time Spent: > 35 minutes    Signed:  Andrew Coelho NP  8/8/2021  10:16 AM

## 2021-08-09 VITALS
DIASTOLIC BLOOD PRESSURE: 83 MMHG | BODY MASS INDEX: 22.46 KG/M2 | RESPIRATION RATE: 18 BRPM | HEART RATE: 78 BPM | HEIGHT: 60 IN | TEMPERATURE: 97.8 F | WEIGHT: 114.42 LBS | SYSTOLIC BLOOD PRESSURE: 145 MMHG | OXYGEN SATURATION: 95 %

## 2021-08-09 LAB
ALBUMIN SERPL-MCNC: 2.9 G/DL (ref 3.5–5)
ALBUMIN/GLOB SERPL: 0.9 {RATIO} (ref 1.1–2.2)
ALP SERPL-CCNC: 119 U/L (ref 45–117)
ALT SERPL-CCNC: 17 U/L (ref 12–78)
ANION GAP SERPL CALC-SCNC: 6 MMOL/L (ref 5–15)
AST SERPL W P-5'-P-CCNC: 20 U/L (ref 15–37)
BASOPHILS # BLD: 0 K/UL (ref 0–0.1)
BASOPHILS NFR BLD: 0 % (ref 0–1)
BILIRUB SERPL-MCNC: 0.9 MG/DL (ref 0.2–1)
BUN SERPL-MCNC: 14 MG/DL (ref 6–20)
BUN/CREAT SERPL: 23 (ref 12–20)
CA-I BLD-MCNC: 8.6 MG/DL (ref 8.5–10.1)
CHLORIDE SERPL-SCNC: 108 MMOL/L (ref 97–108)
CO2 SERPL-SCNC: 25 MMOL/L (ref 21–32)
CREAT SERPL-MCNC: 0.6 MG/DL (ref 0.55–1.02)
DIFFERENTIAL METHOD BLD: ABNORMAL
EOSINOPHIL # BLD: 0.4 K/UL (ref 0–0.4)
EOSINOPHIL NFR BLD: 5 % (ref 0–7)
ERYTHROCYTE [DISTWIDTH] IN BLOOD BY AUTOMATED COUNT: 15.2 % (ref 11.5–14.5)
GLOBULIN SER CALC-MCNC: 3.3 G/DL (ref 2–4)
GLUCOSE SERPL-MCNC: 122 MG/DL (ref 65–100)
HCT VFR BLD AUTO: 36.2 % (ref 35–47)
HGB BLD-MCNC: 12 G/DL (ref 11.5–16)
IMM GRANULOCYTES # BLD AUTO: 0.1 K/UL (ref 0–0.04)
IMM GRANULOCYTES NFR BLD AUTO: 1 % (ref 0–0.5)
LYMPHOCYTES # BLD: 0.8 K/UL (ref 0.8–3.5)
LYMPHOCYTES NFR BLD: 11 % (ref 12–49)
MCH RBC QN AUTO: 34.9 PG (ref 26–34)
MCHC RBC AUTO-ENTMCNC: 33.1 G/DL (ref 30–36.5)
MCV RBC AUTO: 105.2 FL (ref 80–99)
MONOCYTES # BLD: 0.7 K/UL (ref 0–1)
MONOCYTES NFR BLD: 9 % (ref 5–13)
NEUTS SEG # BLD: 5.7 K/UL (ref 1.8–8)
NEUTS SEG NFR BLD: 74 % (ref 32–75)
NRBC # BLD: 0 K/UL (ref 0–0.01)
NRBC BLD-RTO: 0 PER 100 WBC
PLATELET # BLD AUTO: 202 K/UL (ref 150–400)
PMV BLD AUTO: 12.1 FL (ref 8.9–12.9)
POTASSIUM SERPL-SCNC: 3.4 MMOL/L (ref 3.5–5.1)
PROT SERPL-MCNC: 6.2 G/DL (ref 6.4–8.2)
RBC # BLD AUTO: 3.44 M/UL (ref 3.8–5.2)
SARS-COV-2, COV2: NORMAL
SARS-COV-2, COV2: NOT DETECTED
SODIUM SERPL-SCNC: 139 MMOL/L (ref 136–145)
WBC # BLD AUTO: 7.6 K/UL (ref 3.6–11)

## 2021-08-09 PROCEDURE — 36415 COLL VENOUS BLD VENIPUNCTURE: CPT

## 2021-08-09 PROCEDURE — 97530 THERAPEUTIC ACTIVITIES: CPT

## 2021-08-09 PROCEDURE — 80053 COMPREHEN METABOLIC PANEL: CPT

## 2021-08-09 PROCEDURE — 74011250637 HC RX REV CODE- 250/637: Performed by: PHYSICIAN ASSISTANT

## 2021-08-09 PROCEDURE — 74011250637 HC RX REV CODE- 250/637: Performed by: NURSE PRACTITIONER

## 2021-08-09 PROCEDURE — 87635 SARS-COV-2 COVID-19 AMP PRB: CPT

## 2021-08-09 PROCEDURE — 74011250637 HC RX REV CODE- 250/637: Performed by: FAMILY MEDICINE

## 2021-08-09 PROCEDURE — 85025 COMPLETE CBC W/AUTO DIFF WBC: CPT

## 2021-08-09 RX ADMIN — OXYBUTYNIN CHLORIDE 5 MG: 5 TABLET ORAL at 09:06

## 2021-08-09 RX ADMIN — LACTULOSE 30 ML: 20 SOLUTION ORAL at 09:05

## 2021-08-09 RX ADMIN — AMLODIPINE BESYLATE 5 MG: 5 TABLET ORAL at 09:06

## 2021-08-09 RX ADMIN — Medication 10 ML: at 05:23

## 2021-08-09 RX ADMIN — POTASSIUM CHLORIDE 20 MEQ: 1.5 FOR SOLUTION ORAL at 09:05

## 2021-08-09 RX ADMIN — SENNOSIDES 8.6 MG: 8.6 TABLET, FILM COATED ORAL at 09:06

## 2021-08-09 NOTE — PROGRESS NOTES
Report called to Aubrey Dia at [facility]. Patient dc via ambulance. On room air. Alert and oriented x1. Hard of hearing. Discharge plan of care/case management plan validated with provider discharge order. patient paty Luong notified

## 2021-08-09 NOTE — PROGRESS NOTES
PHYSICAL THERAPY TREATMENT  Patient: Claudia Blankenship (89 y.o. female)  Date: 8/9/2021  Diagnosis: Pelvic fracture ([facility] [S32. 9XXA]  Fall [W19. XXXA] Pelvic fracture ([facility]       Precautions:    Chart, physical therapy assessment, plan of care and goals were reviewed. ASSESSMENT  Patient continues with skilled PT services and is progressing towards goals. Pt semi supine in bed upon arrival and agreeable to session. Patient very [address], able to hear better in L ear, however patient still unable to hear most things therapist was saying. Patient able to read written messages and communicate with therapist that way. Completed bed mobility with min A. STS completed 4x during session, each one required mod A and RW for balance upon standing. Attempted ambulation, however pt unable to do so 2/2 pain. Pt able to take 2 side steps up to [facility] with RW and mod A. Pt completed seated therex at EOB, see details below. Pt returned to semi supine position and left resting comfortably with call bell in reach and needs met. Recommending d/c to SNF once medically appropriate. .     Current Level of Function Impacting Discharge (mobility/balance): assistance required for all mobility    Other factors to consider for discharge: PLOF, time since onset, severity of deficits. PLAN :  Patient continues to benefit from skilled intervention to address the above impairments. Continue treatment per established plan of care. to address goals. Recommendation for discharge: (in order for the patient to meet his/her long term goals)  Therapy up to 5 days/week in SNF setting    This discharge recommendation:  Has been made in collaboration with the attending provider and/or case management    IF patient discharges home will need the following DME: to be determined (TBD)       SUBJECTIVE:   Patient stated I can't walk it hurts.     OBJECTIVE DATA SUMMARY:   Critical Behavior:  Neurologic State: Alert  Orientation Level: Oriented to person  Cognition: Follows commands ([employer] able to follow written commands)     Functional Mobility Training:  Bed Mobility:  Rolling: Minimum assistance  Supine to Sit: Minimum assistance  Sit to Supine: Moderate assistance  Scooting: Maximum assistance    Transfers:  Sit to Stand: Moderate assistance  Stand to Sit: Moderate assistance    Balance:  Sitting: Intact  Sitting - Static: Good (unsupported)  Sitting - Dynamic: Fair (occasional)  Standing: Impaired  Standing - Static: Constant support;Poor  Standing - Dynamic : Constant support;Poor    Ambulation/Gait Training:  Distance (ft): 1 Feet (ft)  Assistive Device: Walker, rolling;Gait belt  Ambulation - Level of Assistance: Moderate assistance  Left Side Weight Bearing: As tolerated  Base of Support: Narrowed      Therapeutic Exercises:   1 x 15 LAQ  1  x 10 Marches on RLE only, noted grimace during attempted with LLE and pt stated no    Pain Rating:  No formal rating given, but with standing and scooting pt holding L hip and noted grimace. Pt stating she had pain. Activity Tolerance:   Fair and requires rest breaks  Please refer to the flowsheet for vital signs taken during this treatment. After treatment patient left in no apparent distress:   Supine in bed, Call bell within reach, Bed / chair alarm activated, and Side rails x 3    COMMUNICATION/COLLABORATION:   The patients plan of care was discussed with: Registered nurse. Problem: Mobility Impaired (Adult and Pediatric)  Goal: *Acute Goals and Plan of Care (Insert Text)  Description: Physical Therapy Goals  Initiated 8/7/21  1)  Pt to participate in 3-5 LE exercises in 7 days to improve overall functional mobility. 2)  Bed mobility with min A in 7 days to prevent skin breakdown. 3)  Pt to perform stand pivot transfer from bed to chair with CGA in 7 days. 3)  Pt to amb 10ft with LRAD and Wendy in 7 days, WBAT on LLE. Pt. Goal:  Pt to be able to walk safely without falls.      Outcome: Progressing Towards Goal       Klever Brought, PTA   Time Calculation: 26 mins

## 2021-08-09 NOTE — PROGRESS NOTES
Patient has been accepted to return to [facility] located at [address] [place] [address]. Patient will go into room 184B. Nurse to call report to [phone], ask for the nurse that is receiving Ms. Lexy Mariano. Discharge plan of care/case management plan validated with provider discharge order.

## 2021-08-09 NOTE — PROGRESS NOTES
Problem: Falls - Risk of  Goal: *Absence of Falls  Description: Document Pam Pillow Fall Risk and appropriate interventions in the flowsheet.   Outcome: Progressing Towards Goal  Note: Fall Risk Interventions:  Mobility Interventions: Bed/chair exit alarm    Mentation Interventions: Bed/chair exit alarm    Medication Interventions: Bed/chair exit alarm    Elimination Interventions: Bed/chair exit alarm    History of Falls Interventions: Bed/chair exit alarm

## 2021-08-09 NOTE — DISCHARGE SUMMARY
Admit date: 8/5/2021   Admitting Provider: Christopher Wilkinson MD    Discharge date: 8/9/2021  Discharging Provider: Nereyda Orozco NP      * Admission Diagnoses: Pelvic fracture ([address] [S32. 9XXA]  Fall [W19. XXXA]    * Discharge Diagnoses:    Hospital Problems as of 8/9/2021 Never Reviewed        Codes Class Noted - Resolved POA    * (Principal) Pelvic fracture ([address] ICD-10-CM: S32. 9XXA  ICD-9-CM: 808.8  8/5/2021 - Present Unknown        Fall ICD-10-CM: W19. Laya   ICD-9-CM: E888.9  8/5/2021 - Present Unknown            Abhilash Bower is a 80 y.o. female who presents to the emergency department after a ground-level fall 2020 4 hours ago. Initial x-rays did not reveal any obvious fractures. Patient has a chronic left shoulder fracture from a fall 3 years ago. . CT scan of the abdomen pelvis was performed for further evaluation revealing multiple pelvic fractures mainly on the left and not involving the hips. There was a adjacent hematoma present. Orthopedics has been contacted and would like the patient admitted for observation. CT of the head with no acute intracranial abnormality. Patient is from [facility]. Have discussed the case with the patient's power of , son Eloy Burkitt at home phone number [phone] Course: Orthopedics consulted for management of left proximal humerus fracture. No surgical intervention. Patient can ambulate, weightbearing as tolerated. As needed pain management with tramadol. Outpatient follow-up with orthopedics in 2 weeks. KUB show stool accumulation, started on lactulose and soap milton enema x 1.    * Procedures:   * No surgery found *      Consults: Orthopedic Surgery    Significant Diagnostic Studies:   CT scan of abdomen pelvis taken at [facility] shows a left inferior pelvic ramus and ischium fracture with mild adjacent hematoma.   There is a right superior pubic ramus fracture.     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 Discharge Exam: patient examined on day of discharge  Physical Exam  Vitals and nursing note reviewed. Constitutional:       Appearance: Normal appearance. HENT:      Head: Normocephalic. Nose: Nose normal.      Mouth/Throat:      Mouth: Mucous membranes are moist.   Eyes:      Extraocular Movements: Extraocular movements intact. Cardiovascular:      Rate and Rhythm: Normal rate. Rhythm irregular. Pulses: Normal pulses. Heart sounds: Normal heart sounds. Pulmonary:      Effort: Pulmonary effort is normal.      Breath sounds: Normal breath sounds. Abdominal:      General: Bowel sounds are normal.      Palpations: Abdomen is soft. Musculoskeletal:      Cervical back: Normal range of motion. Comments: Limited ROM BLE, pain bilateral legs with abduction   Skin:     General: Skin is warm and dry. Capillary Refill: Capillary refill takes less than 2 seconds. Neurological:      Mental Status: She is alert. Mental status is at baseline. Psychiatric:         Mood and Affect: Mood normal.         Behavior: Behavior normal.         * Discharge Condition: stable  * Disposition: [place] ([facility])    Discharge Medications:  Current Discharge Medication List      START taking these medications    Details   traMADoL (ULTRAM) 50 mg tablet Take 1 Tablet by mouth every six (6) hours as needed for Pain for up to 3 days. Max Daily Amount: 200 mg. Qty: 12 Tablet, Refills: 0  Start date: 8/7/2021, End date: 8/10/2021    Associated Diagnoses: Closed nondisplaced fracture of pelvis, unspecified part of pelvis, [facility] encounter ([facility]      potassium chloride (KLOR-CON) 20 mEq pack Take 1 Packet by mouth daily for 4 days. Qty: 4 Packet, Refills: 0  Start date: 8/8/2021, End date: 8/12/2021      polyethylene glycol (MIRALAX) 17 gram packet Take 1 Packet by mouth daily for 30 days.   Qty: 30 Packet, Refills: 0  Start date: 8/7/2021, End date: 9/6/2021         CONTINUE these medications which have NOT CHANGED    Details   oxybutynin (DITROPAN) 5 mg tablet Take 5 mg by mouth three (3) times daily. senna (Senna) 8.6 mg tablet Take 1 Tablet by mouth daily. levothyroxine (SYNTHROID) 75 mcg tablet Take 75 mcg by mouth Daily (before breakfast). amLODIPine (Norvasc) 5 mg tablet Take 5 mg by mouth daily. triamcinolone acetonide (KENALOG) 0.1 % ointment Apply  to affected area two (2) times a day. use thin layer      peg 400-propylene glycol (Systane, propylene glycoL,) 0.4-0.3 % drop 2 Drops as needed. STOP taking these medications       guaiFENesin (ROBITUSSIN) 100 mg/5 mL liquid Comments:   Reason for Stopping:               * Follow-up Care/Patient Instructions: Activity: Activity as tolerated and PT/OT Eval and Treat  Diet: Cardiac Diet  Wound Care: None needed    Patient to continue all medication as prescribed  Patient maintain all follow-up appointments  Patient to ambulate weightbearing as tolerated  Patient medically stable for discharge once bed and ride is available    Follow-up Information     Follow up With Specialties Details Why Contact Saintclair Archer, MD Family Medicine In 1 week  [facility] 9      Verona Jorgensen MD Orthopedic Surgery, Sports Medicine In 2 weeks  [place] [address]  [phone]          Patient remains medically stable for discharge pending acceptance back to SNF. Disposition pending covid results.      Time Spent: > 35 minutes    Signed:  Ceci Coelho NP  8/9/2021  10:16 AM

## 2021-08-09 NOTE — PROGRESS NOTES
Patient given soap suds enema . tolerated well. Had moderate amount of soft brown stool in bedpan.  Perineal care given

## 2021-08-10 LAB — SARS-COV-2, COV2NT: NOT DETECTED
